# Patient Record
Sex: MALE | Race: WHITE | NOT HISPANIC OR LATINO | Employment: OTHER | URBAN - NONMETROPOLITAN AREA
[De-identification: names, ages, dates, MRNs, and addresses within clinical notes are randomized per-mention and may not be internally consistent; named-entity substitution may affect disease eponyms.]

---

## 2017-02-03 ENCOUNTER — OFFICE VISIT (OUTPATIENT)
Dept: ONCOLOGY | Facility: CLINIC | Age: 57
End: 2017-02-03

## 2017-02-03 VITALS
SYSTOLIC BLOOD PRESSURE: 128 MMHG | BODY MASS INDEX: 20.62 KG/M2 | HEART RATE: 80 BPM | HEIGHT: 70 IN | TEMPERATURE: 96.7 F | RESPIRATION RATE: 16 BRPM | DIASTOLIC BLOOD PRESSURE: 84 MMHG | OXYGEN SATURATION: 98 % | WEIGHT: 144 LBS

## 2017-02-03 DIAGNOSIS — C02.9 TONGUE CANCER (HCC): Primary | ICD-10-CM

## 2017-02-03 PROCEDURE — 99214 OFFICE O/P EST MOD 30 MIN: CPT | Performed by: INTERNAL MEDICINE

## 2017-02-03 RX ORDER — PANTOPRAZOLE SODIUM 40 MG/1
TABLET, DELAYED RELEASE ORAL
COMMUNITY
Start: 2016-02-08 | End: 2018-02-28

## 2017-02-03 NOTE — PROGRESS NOTES
"[facility] Hematology/Oncology    David Bullock  [id number]  1960    2/3/2017      Subjective    here for a 3 month office visit  There are no diagnoses linked to this encounter.  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  Status post major surgeries, chemotherapy and radiation.  Had the metastatic care cancer to several neck nodes.  Continue to follow-up at [facility].  Most recently had the bone and skin graft to the left jaw area.  His pain is relatively well-controlled on Narco.      Past Medical History, Past Surgical History, Social History, Family History have been reviewed and are without significant change .    ROS:    A comprehensive 14 point review of systems performed and was negative except for chronic dysphonia and chronic cancer pain.         Medications:  The current medication list was reviewed in the EMR    ALLERGIES:    Allergies   Allergen Reactions   • Codeine Nausea And Vomiting       Objective      Vitals:    02/03/17 1033   BP: 128/84   Pulse: 80   Resp: 16   Temp: 96.7 °F (35.9 °C)   TempSrc: Tympanic   SpO2: 98%   Weight: 144 lb (65.3 kg)   Height: 70\" (177.8 cm)           EXAM:  Gen: Cooperative, in no acute distress.  HEENT: No icterus or pallor, oral mucosa moist, PERRLA, EOMI. postop changes noted.  Neck: Supple, no JVD, no thyromegaly   Lymphatics: No cervical, axillary , inguinal lymphadenopathy.  Resp: CTA B/L no wheeze or crackles.  Abdomen: Soft, NT , ND, no hepatosplenomegaly  EXT:  No pedal edema or cyanosis  CNS: AAO x 3 , no focal deficit.  Skin: No rash        RECENT LABS:  Hematology WBC   Date Value Ref Range Status   11/08/2016 5.50 4.80 - 10.80 10*3/mm3 Final     RBC   Date Value Ref Range Status   11/08/2016 4.03 (L) 4.70 - 6.10 10*6/mm3 Final     HEMOGLOBIN   Date Value Ref Range Status "   11/08/2016 11.8 (L) 14.0 - 18.0 g/dL Final     HEMATOCRIT   Date Value Ref Range Status   11/08/2016 38.6 (L) 42.0 - 52.0 % Final     PLATELETS   Date Value Ref Range Status   11/08/2016 217 130 - 400 10*3/mm3 Final       Lab Results   Component Value Date    GLUCOSE 129 (H) 11/08/2016    BUN 15 11/08/2016    CREATININE 0.90 11/08/2016    EGFRIFNONA 87 11/08/2016    BCR 16.7 11/08/2016    CO2 31.0 (H) 11/08/2016    CALCIUM 10.1 11/08/2016    ALBUMIN 4.40 11/08/2016    LABIL2 1.1 11/08/2016    AST 29 11/08/2016    ALT 20 (L) 11/08/2016            Assessment/Plan     1.  Stage IV a moderately differentiated invasive oral cavity squamous cancer status post multiple major surgeries, chemotherapy and radiation.  Clinically CLARK.  We will continue ENT surveillance and postop evaluation at [facility], neck supple and march/2017.    2.  Chronic pain syndrome.  Declined long acting morphine sulfate due to inability to swallow.  Declined fentanyl patch.  Continue Narco.  Refill 120 tablet and that should last for good three-month period    3.  Nutrition.  Able to have some the mouth and continued to have tube feeding.  We will check Iron, B12 and folate at next visit along with CBC.              Josh Schultz MD    2/3/2017

## 2017-02-22 ENCOUNTER — OFFICE VISIT (OUTPATIENT)
Dept: FAMILY MEDICINE CLINIC | Age: 57
End: 2017-02-22
Payer: MEDICARE

## 2017-02-22 VITALS
SYSTOLIC BLOOD PRESSURE: 104 MMHG | BODY MASS INDEX: 19.47 KG/M2 | RESPIRATION RATE: 16 BRPM | WEIGHT: 136 LBS | DIASTOLIC BLOOD PRESSURE: 76 MMHG | HEART RATE: 110 BPM | OXYGEN SATURATION: 93 % | TEMPERATURE: 98.1 F | HEIGHT: 70 IN

## 2017-02-22 DIAGNOSIS — J06.9 ACUTE URI: Primary | ICD-10-CM

## 2017-02-22 DIAGNOSIS — E78.2 MIXED HYPERLIPIDEMIA: ICD-10-CM

## 2017-02-22 DIAGNOSIS — I72.0 CAROTID ANEURYSM, LEFT (HCC): ICD-10-CM

## 2017-02-22 DIAGNOSIS — I65.29 STENOSIS OF CAROTID ARTERY, UNSPECIFIED LATERALITY: ICD-10-CM

## 2017-02-22 PROCEDURE — G8420 CALC BMI NORM PARAMETERS: HCPCS | Performed by: NURSE PRACTITIONER

## 2017-02-22 PROCEDURE — 3017F COLORECTAL CA SCREEN DOC REV: CPT | Performed by: NURSE PRACTITIONER

## 2017-02-22 PROCEDURE — 1036F TOBACCO NON-USER: CPT | Performed by: NURSE PRACTITIONER

## 2017-02-22 PROCEDURE — G8427 DOCREV CUR MEDS BY ELIG CLIN: HCPCS | Performed by: NURSE PRACTITIONER

## 2017-02-22 PROCEDURE — 99214 OFFICE O/P EST MOD 30 MIN: CPT | Performed by: NURSE PRACTITIONER

## 2017-02-22 PROCEDURE — G8484 FLU IMMUNIZE NO ADMIN: HCPCS | Performed by: NURSE PRACTITIONER

## 2017-02-22 PROCEDURE — G8598 ASA/ANTIPLAT THER USED: HCPCS | Performed by: NURSE PRACTITIONER

## 2017-02-22 RX ORDER — CETIRIZINE HYDROCHLORIDE 10 MG/1
10 TABLET ORAL DAILY
Qty: 30 TABLET | Refills: 1 | Status: SHIPPED | OUTPATIENT
Start: 2017-02-22 | End: 2018-05-30

## 2017-02-22 RX ORDER — BENZONATATE 200 MG/1
200 CAPSULE ORAL 3 TIMES DAILY PRN
Qty: 30 CAPSULE | Refills: 0 | Status: SHIPPED | OUTPATIENT
Start: 2017-02-22 | End: 2017-03-04

## 2017-02-22 RX ORDER — AZITHROMYCIN 250 MG/1
TABLET, FILM COATED ORAL
Qty: 1 PACKET | Refills: 0 | Status: SHIPPED | OUTPATIENT
Start: 2017-02-22 | End: 2017-03-04

## 2017-02-22 RX ORDER — CLOPIDOGREL BISULFATE 75 MG/1
75 TABLET ORAL DAILY
Qty: 30 TABLET | Refills: 11 | Status: SHIPPED | OUTPATIENT
Start: 2017-02-22 | End: 2018-02-19 | Stop reason: SDUPTHER

## 2017-02-22 ASSESSMENT — ENCOUNTER SYMPTOMS
SHORTNESS OF BREATH: 0
NAUSEA: 0
BLOOD IN STOOL: 0
ABDOMINAL PAIN: 0
TROUBLE SWALLOWING: 1
CONSTIPATION: 0
COUGH: 1
DIARRHEA: 0

## 2017-04-13 RX ORDER — HYDROCODONE BITARTRATE AND ACETAMINOPHEN 10; 325 MG/1; MG/1
1 TABLET ORAL EVERY 6 HOURS PRN
Qty: 120 TABLET | Refills: 0 | Status: SHIPPED | OUTPATIENT
Start: 2017-04-13 | End: 2017-06-13 | Stop reason: SDUPTHER

## 2017-05-03 ENCOUNTER — LAB (OUTPATIENT)
Dept: ONCOLOGY | Facility: CLINIC | Age: 57
End: 2017-05-03

## 2017-05-03 ENCOUNTER — OFFICE VISIT (OUTPATIENT)
Dept: ONCOLOGY | Facility: CLINIC | Age: 57
End: 2017-05-03

## 2017-05-03 VITALS
TEMPERATURE: 96.7 F | WEIGHT: 145.1 LBS | SYSTOLIC BLOOD PRESSURE: 144 MMHG | BODY MASS INDEX: 20.77 KG/M2 | DIASTOLIC BLOOD PRESSURE: 84 MMHG | OXYGEN SATURATION: 97 % | HEIGHT: 70 IN | HEART RATE: 60 BPM | RESPIRATION RATE: 16 BRPM

## 2017-05-03 DIAGNOSIS — T45.1X5A ANEMIA DUE TO CHEMOTHERAPY: Primary | ICD-10-CM

## 2017-05-03 DIAGNOSIS — D64.9 ANEMIA, UNSPECIFIED TYPE: Primary | ICD-10-CM

## 2017-05-03 DIAGNOSIS — D64.81 ANEMIA DUE TO CHEMOTHERAPY: Primary | ICD-10-CM

## 2017-05-03 DIAGNOSIS — C76.0 HEAD AND NECK CANCER (HCC): Primary | ICD-10-CM

## 2017-05-03 LAB
ALBUMIN SERPL-MCNC: 4.2 G/DL (ref 3.5–5)
ALBUMIN/GLOB SERPL: 1.2 G/DL
ALP SERPL-CCNC: 89 U/L (ref 38–126)
ALT SERPL W P-5'-P-CCNC: 22 U/L (ref 21–72)
ANION GAP SERPL CALCULATED.3IONS-SCNC: 8 MMOL/L
AST SERPL-CCNC: 34 U/L (ref 5–40)
AUTO MIXED CELLS #: 0.3 10*3/UL (ref 0.1–1.5)
AUTO MIXED CELLS %: 6.5 % (ref 0.2–15.1)
BILIRUB SERPL-MCNC: 0.6 MG/DL (ref 0.2–1.3)
BUN BLD-MCNC: 13 MG/DL (ref 9–21)
BUN/CREAT SERPL: 14.4 (ref 7–25)
CALCIUM SPEC-SCNC: 9.7 MG/DL (ref 8.4–10.2)
CHLORIDE SERPL-SCNC: 102 MMOL/L (ref 98–107)
CO2 SERPL-SCNC: 31 MMOL/L (ref 22–30)
CREAT BLD-MCNC: 0.9 MG/DL (ref 0.8–1.5)
ERYTHROCYTE [DISTWIDTH] IN BLOOD BY AUTOMATED COUNT: 15.1 % (ref 11.5–14.5)
GFR SERPL CREATININE-BSD FRML MDRD: 86 ML/MIN/1.73
GLOBULIN UR ELPH-MCNC: 3.4 GM/DL
GLUCOSE BLD-MCNC: 88 MG/DL (ref 75–110)
HCT VFR BLD AUTO: 37 % (ref 42–52)
HGB BLD-MCNC: 11.8 G/DL (ref 14–18)
LYMPHOCYTES # BLD AUTO: 1.1 10*3/MM3 (ref 0.8–7)
LYMPHOCYTES NFR BLD AUTO: 21 % (ref 10–58.5)
MCH RBC QN AUTO: 30.4 PG (ref 27–31)
MCHC RBC AUTO-ENTMCNC: 31.9 G/DL (ref 33–37)
MCV RBC AUTO: 95.3 FL (ref 80–94)
NEUTROPHILS # BLD AUTO: 3.7 10*3/MM3 (ref 2–7.8)
NEUTROPHILS NFR BLD AUTO: 72.5 % (ref 37–92)
PLATELET # BLD AUTO: 327 10*3/MM3 (ref 130–400)
PMV BLD AUTO: 8.8 FL (ref 6–12)
POTASSIUM BLD-SCNC: 4.8 MMOL/L (ref 3.6–5)
PROT SERPL-MCNC: 7.6 G/DL (ref 6.3–8.2)
RBC # BLD AUTO: 3.88 10*6/MM3 (ref 4.7–6.1)
SODIUM BLD-SCNC: 141 MMOL/L (ref 137–145)
WBC NRBC COR # BLD: 5.1 10*3/MM3 (ref 4.8–10.8)

## 2017-05-03 PROCEDURE — 99214 OFFICE O/P EST MOD 30 MIN: CPT | Performed by: INTERNAL MEDICINE

## 2017-05-03 PROCEDURE — 80053 COMPREHEN METABOLIC PANEL: CPT | Performed by: INTERNAL MEDICINE

## 2017-05-03 PROCEDURE — 36415 COLL VENOUS BLD VENIPUNCTURE: CPT | Performed by: INTERNAL MEDICINE

## 2017-05-03 PROCEDURE — 85025 COMPLETE CBC W/AUTO DIFF WBC: CPT | Performed by: INTERNAL MEDICINE

## 2017-05-03 RX ORDER — CLARITHROMYCIN 500 MG/1
TABLET, COATED ORAL
COMMUNITY
End: 2017-08-03

## 2017-05-03 RX ORDER — CETIRIZINE HYDROCHLORIDE 10 MG/1
TABLET ORAL
COMMUNITY
Start: 2017-02-22 | End: 2017-08-03

## 2017-05-03 RX ORDER — LANSOPRAZOLE, AMOXICILLIN, CLARITHROMYCIN 30-500-500
KIT ORAL
COMMUNITY
Start: 2015-12-14 | End: 2017-08-03

## 2017-05-09 LAB — TSH SERPL DL<=0.005 MIU/L-ACNC: 0.61 MIU/ML (ref 0.47–4.68)

## 2017-06-13 RX ORDER — HYDROCODONE BITARTRATE AND ACETAMINOPHEN 10; 325 MG/1; MG/1
1 TABLET ORAL EVERY 6 HOURS PRN
Qty: 120 TABLET | Refills: 0 | Status: SHIPPED | OUTPATIENT
Start: 2017-06-13 | End: 2017-08-03 | Stop reason: SDUPTHER

## 2017-08-03 ENCOUNTER — LAB (OUTPATIENT)
Dept: ONCOLOGY | Facility: CLINIC | Age: 57
End: 2017-08-03

## 2017-08-03 ENCOUNTER — OFFICE VISIT (OUTPATIENT)
Dept: ONCOLOGY | Facility: CLINIC | Age: 57
End: 2017-08-03

## 2017-08-03 VITALS
HEIGHT: 70 IN | SYSTOLIC BLOOD PRESSURE: 122 MMHG | RESPIRATION RATE: 16 BRPM | OXYGEN SATURATION: 98 % | HEART RATE: 60 BPM | BODY MASS INDEX: 20.96 KG/M2 | TEMPERATURE: 96.9 F | DIASTOLIC BLOOD PRESSURE: 82 MMHG | WEIGHT: 146.4 LBS

## 2017-08-03 DIAGNOSIS — C76.0 HEAD AND NECK CANCER (HCC): Primary | ICD-10-CM

## 2017-08-03 DIAGNOSIS — C76.0 MALIGNANT NEOPLASM OF HEAD, FACE, AND NECK (HCC): Primary | ICD-10-CM

## 2017-08-03 LAB
ALBUMIN SERPL-MCNC: 4.2 G/DL (ref 3.5–5)
ALBUMIN/GLOB SERPL: 1.3 G/DL
ALP SERPL-CCNC: 114 U/L (ref 38–126)
ALT SERPL W P-5'-P-CCNC: 21 U/L (ref 21–72)
ANION GAP SERPL CALCULATED.3IONS-SCNC: 8 MMOL/L
AST SERPL-CCNC: 29 U/L (ref 5–40)
AUTO MIXED CELLS #: 0.3 10*3/MM3 (ref 0.1–1.5)
AUTO MIXED CELLS %: 7.5 % (ref 0.2–15.1)
BILIRUB SERPL-MCNC: 0.5 MG/DL (ref 0.2–1.3)
BUN BLD-MCNC: 8 MG/DL (ref 9–21)
BUN/CREAT SERPL: 8 (ref 7–25)
CALCIUM SPEC-SCNC: 9.6 MG/DL (ref 8.4–10.2)
CHLORIDE SERPL-SCNC: 103 MMOL/L (ref 98–107)
CO2 SERPL-SCNC: 30 MMOL/L (ref 22–30)
CREAT BLD-MCNC: 1 MG/DL (ref 0.8–1.5)
ERYTHROCYTE [DISTWIDTH] IN BLOOD BY AUTOMATED COUNT: 14.2 % (ref 11.5–14.5)
GFR SERPL CREATININE-BSD FRML MDRD: 76 ML/MIN/1.73
GLOBULIN UR ELPH-MCNC: 3.3 GM/DL
GLUCOSE BLD-MCNC: 88 MG/DL (ref 75–110)
HCT VFR BLD AUTO: 39 % (ref 42–52)
HGB BLD-MCNC: 12.3 G/DL (ref 14–18)
LYMPHOCYTES # BLD AUTO: 1 10*3/MM3 (ref 0.8–7)
LYMPHOCYTES NFR BLD AUTO: 27.7 % (ref 10–58.5)
MCH RBC QN AUTO: 29.7 PG (ref 27–31)
MCHC RBC AUTO-ENTMCNC: 31.5 G/DL (ref 33–37)
MCV RBC AUTO: 94.3 FL (ref 80–94)
NEUTROPHILS # BLD AUTO: 2.3 10*3/MM3 (ref 2–7.8)
NEUTROPHILS NFR BLD AUTO: 64.8 % (ref 37–92)
PLATELET # BLD AUTO: 291 10*3/MM3 (ref 130–400)
PMV BLD AUTO: 8.2 FL (ref 6–12)
POTASSIUM BLD-SCNC: 4.9 MMOL/L (ref 3.6–5)
PROT SERPL-MCNC: 7.5 G/DL (ref 6.3–8.2)
RBC # BLD AUTO: 4.14 10*6/MM3 (ref 4.7–6.1)
SODIUM BLD-SCNC: 141 MMOL/L (ref 137–145)
WBC NRBC COR # BLD: 3.5 10*3/MM3 (ref 4.8–10.8)

## 2017-08-03 PROCEDURE — 85025 COMPLETE CBC W/AUTO DIFF WBC: CPT | Performed by: INTERNAL MEDICINE

## 2017-08-03 PROCEDURE — 36415 COLL VENOUS BLD VENIPUNCTURE: CPT | Performed by: INTERNAL MEDICINE

## 2017-08-03 PROCEDURE — 99213 OFFICE O/P EST LOW 20 MIN: CPT | Performed by: INTERNAL MEDICINE

## 2017-08-03 PROCEDURE — 80053 COMPREHEN METABOLIC PANEL: CPT | Performed by: INTERNAL MEDICINE

## 2017-08-03 RX ORDER — HYDROCODONE BITARTRATE AND ACETAMINOPHEN 10; 325 MG/1; MG/1
1 TABLET ORAL EVERY 6 HOURS PRN
Qty: 120 TABLET | Refills: 0 | Status: SHIPPED | OUTPATIENT
Start: 2017-08-03 | End: 2017-09-14 | Stop reason: SDUPTHER

## 2017-08-03 NOTE — PROGRESS NOTES
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  Status post major surgeries, including bone graft from his right lower leg to his jaw, chemotherapy and radiation.  Continue to follow-up at [facility].   he has follow-up August 9.  He is due for follow-up CT scan which he will obtain in [place]  His pain is relatively well-controlled on Narco.  He is here today for refill of his normal call.  He is on puréed diet per oral, only uses his PEG tube for medication.  He denies fever chills, denies nausea or vomiting, denies headache.  He has maintained his weight.    Past Medical History:   Past Medical History:   Diagnosis Date   • Dysphonia    • Encounter for antineoplastic chemotherapy    • Encounter for long-term (current) use of medications    • Foot drop    • Iron deficiency anemia, unspecified    • Squamous cell cancer of tongue      Past Surgical History:   Past Surgical History:   Procedure Laterality Date   • CORONARY STENT PLACEMENT     • MOUTH SURGERY     • PORTACATH PLACEMENT     • TONGUE BIOPSY / EXCISION       Social History:   Social History     Social History   • Marital status: Single     Spouse name: N/A   • Number of children: N/A   • Years of education: N/A     Occupational History   • Not on file.     Social History Main Topics   • Smoking status: Former Smoker   • Smokeless tobacco: Not on file   • Alcohol use No   • Drug use: No   • Sexual activity: Defer     Other Topics Concern   • Not on file     Social History Narrative     Family History:   Family History   Problem Relation Age of Onset   • No Known Problems Mother    • Heart failure Father    • No Known Problems Daughter    • No Known Problems Son    • No Known Problems Daughter        Review of Systems  "  Constitutional: Negative.    HENT: Negative for congestion, dental problem, drooling, ear discharge, ear pain, facial swelling, hearing loss, mouth sores, sinus pressure, sore throat, tinnitus, trouble swallowing and voice change.    Eyes: Negative for photophobia, pain, discharge, redness, itching and visual disturbance.   Respiratory: Negative for cough, choking, chest tightness, shortness of breath, wheezing and stridor.    Cardiovascular: Negative.    Gastrointestinal: Negative for abdominal distention, abdominal pain, constipation, diarrhea, nausea and vomiting.   Endocrine: Negative for polyuria.   Genitourinary: Negative for flank pain and hematuria.   Musculoskeletal: Negative.    Skin: Negative for color change.   Neurological: Negative for dizziness, tremors, seizures, syncope, facial asymmetry, speech difficulty, weakness, light-headedness, numbness and headaches.   Hematological: Negative for adenopathy. Does not bruise/bleed easily.   Psychiatric/Behavioral: Negative for behavioral problems and confusion.        Medications:  The current medication list was reviewed in the EMR    ALLERGIES:    Allergies   Allergen Reactions   • Codeine Nausea And Vomiting       Objective      Vitals:    08/03/17 0905   BP: 122/82   Pulse: 60   Resp: 16   Temp: 96.9 °F (36.1 °C)   TempSrc: Tympanic   SpO2: 98%   Weight: 146 lb 6.4 oz (66.4 kg)   Height: 70\" (177.8 cm)       Current Status 8/3/2017   ECOG score 1         General Appearance:   Cachectic-appearing, Alert, cooperative, no distress, appears stated age   Head:    Normocephalic, without obvious abnormality, atraumatic   Eyes:    PERRL, conjunctiva/corneas clear, EOM's intact, fundi     benign, both eyes   Ears:    Normal TM's and external ear canals, both ears   Nose:   Nares normal, septum midline, mucosa normal, no drainage     or sinus tenderness   Throat:   Lips, mucosa, and tongue normal; teeth and gums normal   Neck:   Surgical scar well-healed, " radiation changes, scab on the left upper jaw. no adenopathy;     thyroid:  no enlargement/tenderness/nodules; no carotid    bruit or JVD   Back:     Symmetric, no curvature, ROM normal, no CVA tenderness   Lungs:     Clear to auscultation bilaterally, respirations unlabored   Chest Wall:    No tenderness or deformity    Heart:    Regular rate and rhythm, S1 and S2 normal, no murmur, rub    or gallop   Abdomen:     Soft, non-tender, bowel sounds active all four quadrants,     no masses, no organomegaly.PEG tube intact    Extremities:   Extremities normal, atraumatic, no cyanosis or edema   Pulses:   2+ and symmetric all extremities   Skin:   Skin color, texture, turgor normal, no rashes or lesions   Lymph nodes:   Cervical, supraclavicular, and axillary nodes normal   Neurologic:   CNII-XII intact, normal strength, sensation and reflexes     throughout       RECENT LABS:  Lab Results (last 24 hours)     ** No results found for the last 24 hours. **          RADIOLOGY:  No results found.      VTE prophylaxis reviewed and addressed    Assessment/Plan  57-year-old male diagnosed with moderately differentiated invasive squamous cell carcinoma of the oral cavity stage IV a status post major surgeries including jaw transplant from a bone in his right leg, status post chemotherapy and radiation, currently CLARK.  Continue follow-up at the [facility] in [place]. CT due at that visit .  We will  see him in 3 months unless things change.  Jaw pain: Continue Narco refilled today.           Time Spent: 30 minutes; greater than  50% of time was spent in patient counseling and care coordination.       Ena Kelsey MD    8/4/2017    1:06 PM

## 2017-09-14 RX ORDER — HYDROCODONE BITARTRATE AND ACETAMINOPHEN 10; 325 MG/1; MG/1
1 TABLET ORAL EVERY 6 HOURS PRN
Qty: 120 TABLET | Refills: 0 | Status: SHIPPED | OUTPATIENT
Start: 2017-09-14 | End: 2017-10-24 | Stop reason: SDUPTHER

## 2017-10-24 RX ORDER — HYDROCODONE BITARTRATE AND ACETAMINOPHEN 10; 325 MG/1; MG/1
1 TABLET ORAL EVERY 6 HOURS PRN
Qty: 120 TABLET | Refills: 0 | Status: SHIPPED | OUTPATIENT
Start: 2017-10-24 | End: 2017-11-29 | Stop reason: SDUPTHER

## 2017-11-03 DIAGNOSIS — C76.0 MALIGNANT NEOPLASM OF HEAD, FACE, AND NECK (HCC): Primary | ICD-10-CM

## 2017-11-07 ENCOUNTER — APPOINTMENT (OUTPATIENT)
Dept: LAB | Facility: HOSPITAL | Age: 57
End: 2017-11-07

## 2017-11-13 ENCOUNTER — APPOINTMENT (OUTPATIENT)
Dept: LAB | Facility: HOSPITAL | Age: 57
End: 2017-11-13

## 2017-11-13 DIAGNOSIS — C76.0 MALIGNANT NEOPLASM OF HEAD, FACE, AND NECK (HCC): Primary | ICD-10-CM

## 2017-11-15 ENCOUNTER — APPOINTMENT (OUTPATIENT)
Dept: LAB | Facility: HOSPITAL | Age: 57
End: 2017-11-15

## 2017-11-27 DIAGNOSIS — C76.0 MALIGNANT NEOPLASM OF HEAD, FACE, AND NECK (HCC): Primary | ICD-10-CM

## 2017-11-29 ENCOUNTER — OFFICE VISIT (OUTPATIENT)
Dept: ONCOLOGY | Facility: CLINIC | Age: 57
End: 2017-11-29

## 2017-11-29 ENCOUNTER — LAB (OUTPATIENT)
Dept: LAB | Facility: HOSPITAL | Age: 57
End: 2017-11-29

## 2017-11-29 VITALS
BODY MASS INDEX: 20.14 KG/M2 | WEIGHT: 140.7 LBS | SYSTOLIC BLOOD PRESSURE: 148 MMHG | RESPIRATION RATE: 18 BRPM | HEIGHT: 70 IN | DIASTOLIC BLOOD PRESSURE: 88 MMHG | HEART RATE: 52 BPM | OXYGEN SATURATION: 98 % | TEMPERATURE: 97.9 F

## 2017-11-29 DIAGNOSIS — D50.8 OTHER IRON DEFICIENCY ANEMIA: Primary | ICD-10-CM

## 2017-11-29 DIAGNOSIS — C76.0 HEAD AND NECK CANCER (HCC): Primary | ICD-10-CM

## 2017-11-29 DIAGNOSIS — C76.0 MALIGNANT NEOPLASM OF HEAD, FACE AND NECK (HCC): Primary | ICD-10-CM

## 2017-11-29 LAB
ALBUMIN SERPL-MCNC: 4 G/DL (ref 3.5–5)
ALBUMIN/GLOB SERPL: 1.3 G/DL (ref 1.1–2.5)
ALP SERPL-CCNC: 101 U/L (ref 24–120)
ALT SERPL W P-5'-P-CCNC: 33 U/L (ref 0–54)
ANION GAP SERPL CALCULATED.3IONS-SCNC: 9 MMOL/L (ref 4–13)
AST SERPL-CCNC: 26 U/L (ref 7–45)
AUTO MIXED CELLS #: 0.7 10*3/MM3 (ref 0.1–2.6)
AUTO MIXED CELLS %: 12.4 % (ref 0.1–24)
BILIRUB SERPL-MCNC: 0.4 MG/DL (ref 0.1–1)
BUN BLD-MCNC: 12 MG/DL (ref 5–21)
BUN/CREAT SERPL: 15.2
CALCIUM SPEC-SCNC: 9.5 MG/DL (ref 8.4–10.4)
CHLORIDE SERPL-SCNC: 98 MMOL/L (ref 98–110)
CO2 SERPL-SCNC: 30 MMOL/L (ref 24–31)
CREAT BLD-MCNC: 0.79 MG/DL (ref 0.5–1.4)
ERYTHROCYTE [DISTWIDTH] IN BLOOD BY AUTOMATED COUNT: 14.2 % (ref 12–15)
FERRITIN SERPL-MCNC: 44.4 NG/ML (ref 17.9–464)
GFR SERPL CREATININE-BSD FRML MDRD: 100 ML/MIN/1.73
GLOBULIN UR ELPH-MCNC: 3.2 GM/DL
GLUCOSE BLD-MCNC: 88 MG/DL (ref 70–100)
HCT VFR BLD AUTO: 34.8 % (ref 40–52)
HGB BLD-MCNC: 11.8 G/DL (ref 14–18)
HOLD SPECIMEN: NORMAL
IRON 24H UR-MRATE: 75 MCG/DL (ref 42–180)
IRON SATN MFR SERPL: 19 % (ref 20–45)
LYMPHOCYTES # BLD AUTO: 1.4 10*3/MM3 (ref 0.8–7)
LYMPHOCYTES NFR BLD AUTO: 25.6 % (ref 15–45)
MCH RBC QN AUTO: 30.5 PG (ref 28–32)
MCHC RBC AUTO-ENTMCNC: 33.9 G/DL (ref 33–36)
MCV RBC AUTO: 89.9 FL (ref 82–95)
NEUTROPHILS # BLD AUTO: 3.2 10*3/MM3 (ref 1.5–8.3)
NEUTROPHILS NFR BLD AUTO: 62 % (ref 39–78)
PLATELET # BLD AUTO: 251 10*3/MM3 (ref 130–400)
PMV BLD AUTO: 9.1 FL (ref 6–12)
POTASSIUM BLD-SCNC: 4.3 MMOL/L (ref 3.5–5.3)
PROT SERPL-MCNC: 7.2 G/DL (ref 6.3–8.7)
RBC # BLD AUTO: 3.87 10*6/MM3 (ref 4.2–5.4)
SODIUM BLD-SCNC: 137 MMOL/L (ref 135–145)
TIBC SERPL-MCNC: 395 MCG/DL (ref 225–420)
WBC NRBC COR # BLD: 5.3 10*3/MM3 (ref 4.8–10.8)

## 2017-11-29 PROCEDURE — 83540 ASSAY OF IRON: CPT | Performed by: INTERNAL MEDICINE

## 2017-11-29 PROCEDURE — 83550 IRON BINDING TEST: CPT | Performed by: INTERNAL MEDICINE

## 2017-11-29 PROCEDURE — 36415 COLL VENOUS BLD VENIPUNCTURE: CPT

## 2017-11-29 PROCEDURE — 85025 COMPLETE CBC W/AUTO DIFF WBC: CPT | Performed by: INTERNAL MEDICINE

## 2017-11-29 PROCEDURE — 99214 OFFICE O/P EST MOD 30 MIN: CPT | Performed by: INTERNAL MEDICINE

## 2017-11-29 PROCEDURE — 80053 COMPREHEN METABOLIC PANEL: CPT | Performed by: INTERNAL MEDICINE

## 2017-11-29 PROCEDURE — 82728 ASSAY OF FERRITIN: CPT | Performed by: INTERNAL MEDICINE

## 2017-11-29 RX ORDER — HYDROCODONE BITARTRATE AND ACETAMINOPHEN 10; 325 MG/1; MG/1
1 TABLET ORAL EVERY 6 HOURS PRN
Qty: 120 TABLET | Refills: 0 | Status: SHIPPED | OUTPATIENT
Start: 2017-11-29 | End: 2018-01-02 | Stop reason: SDUPTHER

## 2017-11-29 NOTE — PROGRESS NOTES
"[facility] Hematology/Oncology    David Bullock  [id number]  1960 11/29/2017      Subjective    here for a 3 month office visit   was seen today for follow-up.    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  Status post major surgeries, chemotherapy and radiation.  Had the metastatic care cancer to several neck nodes.  Continue to follow-up at [facility].  Most recently had the bone and skin graft to the left jaw area.  His pain is relatively well-controlled on Narco.      Past Medical History, Past Surgical History, Social History, Family History have been reviewed and are without significant change .    ROS:    A comprehensive 14 point review of systems performed and was negative except for chronic dysphonia and chronic cancer pain.         Medications:  The current medication list was reviewed in the EMR    ALLERGIES:    Allergies   Allergen Reactions   • Codeine Nausea And Vomiting       Objective      Vitals:    11/29/17 1353   BP: 148/88   Pulse: 52   Resp: 18   Temp: 97.9 °F (36.6 °C)   TempSrc: Tympanic   SpO2: 98%   Weight: 140 lb 11.2 oz (63.8 kg)   Height: 70\" (177.8 cm)           EXAM:  Gen: Cooperative, in no acute distress.  HEENT: No icterus or pallor, oral mucosa moist, PERRLA, EOMI. postop changes noted.  Neck: Supple, no JVD, no thyromegaly   Lymphatics: No cervical, axillary , inguinal lymphadenopathy.  Resp: CTA B/L no wheeze or crackles.  Abdomen: Soft, NT , ND, no hepatosplenomegaly  EXT:  No pedal edema or cyanosis  CNS: AAO x 3 , no focal deficit.  Skin: No rash        RECENT LABS:  Hematology WBC   Date Value Ref Range Status   11/29/2017 5.30 4.80 - 10.80 10*3/mm3 Final     RBC   Date Value Ref Range Status   11/29/2017 3.87 (L) 4.20 " - 5.40 10*6/mm3 Final     Hemoglobin   Date Value Ref Range Status   11/29/2017 11.8 (L) 14.0 - 18.0 g/dL Final     Hematocrit   Date Value Ref Range Status   11/29/2017 34.8 (L) 40.0 - 52.0 % Final     Platelets   Date Value Ref Range Status   11/29/2017 251 130 - 400 10*3/mm3 Final       Lab Results   Component Value Date    GLUCOSE 88 08/03/2017    BUN 8 (L) 08/03/2017    CREATININE 1.00 08/03/2017    EGFRIFNONA 76 08/03/2017    BCR 8.0 08/03/2017    CO2 30.0 08/03/2017    CALCIUM 9.6 08/03/2017    ALBUMIN 4.20 08/03/2017    LABIL2 1.3 08/03/2017    AST 29 08/03/2017    ALT 21 08/03/2017            Assessment/Plan     1.  Stage IV a moderately differentiated invasive oral cavity squamous cancer status post multiple major surgeries, chemotherapy and radiation, 2013.  Clinically CLARK.  We will continue ENT surveillance and postop evaluation at [facility], who are planning to see him next month. If they do not order a CT scan of Head and Neck and chest then I would be doing it here.  He removed the G tube himself and therefore has lost 6 Ilbs. I have encouraged him to incrase suppliments like boost etc.    2.  Chronic pain syndrome.  Declined long acting morphine sulfate due to inability to swallow.  Declined fentanyl patch.  Continue Narco.  Refill 120 tablet and that should last for good three-month period    3.  Nutrition.  Able to have some by mouth and continued to have tube feeding.  We will check TSH.    4.  He has an appointment at [facility] next 2 weeks therefore we will let them do further imaging studies.  The last imaging studies done here or in August 2016 were negative.              Nickolas Hines MD    11/29/2017

## 2018-01-02 RX ORDER — HYDROCODONE BITARTRATE AND ACETAMINOPHEN 10; 325 MG/1; MG/1
1 TABLET ORAL EVERY 6 HOURS PRN
Qty: 120 TABLET | Refills: 0 | Status: SHIPPED | OUTPATIENT
Start: 2018-01-02 | End: 2018-02-01 | Stop reason: SDUPTHER

## 2018-01-03 DIAGNOSIS — C76.0 MALIGNANT NEOPLASM OF HEAD, FACE, AND NECK (HCC): Primary | ICD-10-CM

## 2018-01-23 ENCOUNTER — HOSPITAL ENCOUNTER (OUTPATIENT)
Dept: CT IMAGING | Facility: HOSPITAL | Age: 58
Discharge: HOME | End: 2018-01-23
Attending: INTERNAL MEDICINE | Admitting: INTERNAL MEDICINE

## 2018-01-23 DIAGNOSIS — C76.0 MALIGNANT NEOPLASM OF HEAD, FACE, AND NECK (HCC): ICD-10-CM

## 2018-01-23 LAB — CREAT BLDA-MCNC: 0.9 MG/DL (ref 0.6–1.3)

## 2018-01-23 PROCEDURE — 0 IOPAMIDOL 61 % SOLUTION: Performed by: INTERNAL MEDICINE

## 2018-01-23 PROCEDURE — 70491 CT SOFT TISSUE NECK W/DYE: CPT

## 2018-01-23 PROCEDURE — 82565 ASSAY OF CREATININE: CPT

## 2018-01-23 RX ADMIN — IOPAMIDOL 100 ML: 612 INJECTION, SOLUTION INTRAVENOUS at 11:15

## 2018-02-01 RX ORDER — HYDROCODONE BITARTRATE AND ACETAMINOPHEN 10; 325 MG/1; MG/1
1 TABLET ORAL EVERY 6 HOURS PRN
Qty: 120 TABLET | Refills: 0 | Status: SHIPPED | OUTPATIENT
Start: 2018-02-01 | End: 2018-02-28 | Stop reason: SDUPTHER

## 2018-02-19 DIAGNOSIS — I65.29 STENOSIS OF CAROTID ARTERY, UNSPECIFIED LATERALITY: ICD-10-CM

## 2018-02-19 RX ORDER — CLOPIDOGREL BISULFATE 75 MG/1
TABLET ORAL
Qty: 30 TABLET | Refills: 0 | Status: SHIPPED | OUTPATIENT
Start: 2018-02-19 | End: 2018-05-23 | Stop reason: SDUPTHER

## 2018-02-26 DIAGNOSIS — C76.0 MALIGNANT NEOPLASM OF HEAD, FACE, AND NECK (HCC): Primary | ICD-10-CM

## 2018-02-28 ENCOUNTER — OFFICE VISIT (OUTPATIENT)
Dept: ONCOLOGY | Facility: CLINIC | Age: 58
End: 2018-02-28

## 2018-02-28 ENCOUNTER — LAB (OUTPATIENT)
Dept: LAB | Facility: HOSPITAL | Age: 58
End: 2018-02-28

## 2018-02-28 VITALS
HEIGHT: 70 IN | WEIGHT: 140.8 LBS | BODY MASS INDEX: 20.16 KG/M2 | HEART RATE: 60 BPM | OXYGEN SATURATION: 97 % | SYSTOLIC BLOOD PRESSURE: 138 MMHG | DIASTOLIC BLOOD PRESSURE: 78 MMHG | RESPIRATION RATE: 16 BRPM | TEMPERATURE: 97 F

## 2018-02-28 DIAGNOSIS — C76.0 HEAD AND NECK CANCER (HCC): Primary | ICD-10-CM

## 2018-02-28 LAB
ALBUMIN SERPL-MCNC: 3.8 G/DL (ref 3.5–5)
ALBUMIN/GLOB SERPL: 1.2 G/DL (ref 1.1–2.5)
ALP SERPL-CCNC: 83 U/L (ref 24–120)
ALT SERPL W P-5'-P-CCNC: 20 U/L (ref 0–54)
ANION GAP SERPL CALCULATED.3IONS-SCNC: 9 MMOL/L (ref 4–13)
AST SERPL-CCNC: 45 U/L (ref 7–45)
BASOPHILS # BLD AUTO: 0.02 10*3/MM3 (ref 0–0.2)
BASOPHILS NFR BLD AUTO: 0.2 % (ref 0–2)
BILIRUB SERPL-MCNC: 0.3 MG/DL (ref 0.1–1)
BUN BLD-MCNC: 12 MG/DL (ref 5–21)
BUN/CREAT SERPL: 17.1 (ref 7–25)
CALCIUM SPEC-SCNC: 9.3 MG/DL (ref 8.4–10.4)
CHLORIDE SERPL-SCNC: 99 MMOL/L (ref 98–110)
CO2 SERPL-SCNC: 31 MMOL/L (ref 24–31)
CREAT BLD-MCNC: 0.7 MG/DL (ref 0.5–1.4)
DEPRECATED RDW RBC AUTO: 44.5 FL (ref 40–54)
EOSINOPHIL # BLD AUTO: 0.11 10*3/MM3 (ref 0–0.7)
EOSINOPHIL NFR BLD AUTO: 1 % (ref 0–4)
ERYTHROCYTE [DISTWIDTH] IN BLOOD BY AUTOMATED COUNT: 13.5 % (ref 12–15)
GFR SERPL CREATININE-BSD FRML MDRD: 115 ML/MIN/1.73
GLOBULIN UR ELPH-MCNC: 3.2 GM/DL
GLUCOSE BLD-MCNC: 102 MG/DL (ref 70–100)
HCT VFR BLD AUTO: 32.1 % (ref 40–52)
HGB BLD-MCNC: 10.3 G/DL (ref 14–18)
HOLD SPECIMEN: NORMAL
HOLD SPECIMEN: NORMAL
IMM GRANULOCYTES # BLD: 0.03 10*3/MM3 (ref 0–0.03)
IMM GRANULOCYTES NFR BLD: 0.3 % (ref 0–5)
LYMPHOCYTES # BLD AUTO: 0.53 10*3/MM3 (ref 0.72–4.86)
LYMPHOCYTES NFR BLD AUTO: 5 % (ref 15–45)
MCH RBC QN AUTO: 28.6 PG (ref 28–32)
MCHC RBC AUTO-ENTMCNC: 32.1 G/DL (ref 33–36)
MCV RBC AUTO: 89.2 FL (ref 82–95)
MONOCYTES # BLD AUTO: 0.29 10*3/MM3 (ref 0.19–1.3)
MONOCYTES NFR BLD AUTO: 2.7 % (ref 4–12)
NEUTROPHILS # BLD AUTO: 9.57 10*3/MM3 (ref 1.87–8.4)
NEUTROPHILS NFR BLD AUTO: 90.8 % (ref 39–78)
NRBC BLD MANUAL-RTO: 0 /100 WBC (ref 0–0)
PLATELET # BLD AUTO: 416 10*3/MM3 (ref 130–400)
PMV BLD AUTO: 9.6 FL (ref 6–12)
POTASSIUM BLD-SCNC: 4.8 MMOL/L (ref 3.5–5.3)
PROT SERPL-MCNC: 7 G/DL (ref 6.3–8.7)
RBC # BLD AUTO: 3.6 10*6/MM3 (ref 4.8–5.9)
SODIUM BLD-SCNC: 139 MMOL/L (ref 135–145)
WBC NRBC COR # BLD: 10.55 10*3/MM3 (ref 4.8–10.8)

## 2018-02-28 PROCEDURE — 85025 COMPLETE CBC W/AUTO DIFF WBC: CPT | Performed by: INTERNAL MEDICINE

## 2018-02-28 PROCEDURE — 36415 COLL VENOUS BLD VENIPUNCTURE: CPT

## 2018-02-28 PROCEDURE — 80053 COMPREHEN METABOLIC PANEL: CPT | Performed by: INTERNAL MEDICINE

## 2018-02-28 PROCEDURE — 99214 OFFICE O/P EST MOD 30 MIN: CPT | Performed by: INTERNAL MEDICINE

## 2018-02-28 RX ORDER — HYDROCODONE BITARTRATE AND ACETAMINOPHEN 10; 325 MG/1; MG/1
1 TABLET ORAL EVERY 6 HOURS PRN
Qty: 120 TABLET | Refills: 0 | Status: SHIPPED | OUTPATIENT
Start: 2018-02-28 | End: 2018-03-28 | Stop reason: SDUPTHER

## 2018-02-28 NOTE — PROGRESS NOTES
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  Status post major surgeries, chemotherapy and radiation.  Had the metastatic care cancer to several neck nodes.  Continue to follow-up at [facility].  Most recently had the bone and skin graft to the left jaw area.  His pain is relatively well-controlled on Narco.      Past Medical History, Past Surgical History, Social History, Family History have been reviewed and are without significant change .    ROS:    A comprehensive 14 point review of systems performed and was negative except for chronic dysphonia and chronic cancer pain.         Medications:  The current medication list was reviewed in the EMR    ALLERGIES:    Allergies   Allergen Reactions   • Codeine Nausea And Vomiting       Objective      Vitals:    02/28/18 1317   BP: 138/78   Pulse: 60   Resp: 16   Temp: 97 °F (36.1 °C)   TempSrc: Tympanic   SpO2: 97%   Weight: 63.9 kg (140 lb 12.8 oz)   Height: 177.8 cm (70\")           EXAM:  Gen: Cooperative, in no acute distress.  HEENT: No icterus or pallor, oral mucosa moist, PERRLA, EOMI. postop changes noted.  Neck: Supple, no JVD, no thyromegaly   Lymphatics: No cervical, axillary , inguinal lymphadenopathy.  Resp: CTA B/L no wheeze or crackles.  Abdomen: Soft, NT , ND, no hepatosplenomegaly  EXT:  No pedal edema or cyanosis  CNS: AAO x 3 , no focal deficit.  Skin: No rash        RECENT LABS:  Hematology WBC   Date Value Ref Range Status   02/28/2018 10.55 4.80 - 10.80 10*3/mm3 Final     RBC   Date Value Ref Range Status   02/28/2018 3.60 (L) 4.80 - 5.90 10*6/mm3 Final     Hemoglobin "   Date Value Ref Range Status   02/28/2018 10.3 (L) 14.0 - 18.0 g/dL Final     Hematocrit   Date Value Ref Range Status   02/28/2018 32.1 (L) 40.0 - 52.0 % Final     Platelets   Date Value Ref Range Status   02/28/2018 416 (H) 130 - 400 10*3/mm3 Final       Lab Results   Component Value Date    GLUCOSE 102 (H) 02/28/2018    BUN 12 02/28/2018    CREATININE 0.70 02/28/2018    EGFRIFNONA 115 02/28/2018    BCR 17.1 02/28/2018    CO2 31.0 02/28/2018    CALCIUM 9.3 02/28/2018    ALBUMIN 3.80 02/28/2018    LABIL2 1.2 02/28/2018    AST 45 02/28/2018    ALT 20 02/28/2018            Assessment/Plan     1.  Stage IV a moderately differentiated invasive oral cavity squamous cancer status post multiple major surgeries, chemotherapy and radiation, 2013.  Clinically CLARK.  We will continue ENT surveillance and postop evaluation at [facility], who are planning to see him next month. If they do not order a CT scan of Head and Neck and chest then I would be doing it here.  He removed the G tube himself and therefore has lost 6 Ilbs. I have encouraged him to incrase suppliments like boost etc.    2.  Chronic pain syndrome.  Declined long acting morphine sulfate due to inability to swallow.  Declined fentanyl patch.  Continue Narco.  Refill 120 tablet and that should last for good three-month period    3.  Nutrition.  Able to have some by mouth and continued to have tube feeding.  We will check TSH.    4.  He has an appointment at [facility] next 2 weeks therefore we will let them do further imaging studies.    Repeat CT soft tissue neck Jan 2018 shows CLARK, only Carotid L Stenosis f/u Neuro Rads at [facility]. Doing well, contine monitoring, C X R prior to next vist as the risk of second cancers common in Head and Neck cancers.              Nickolas Hines MD    2/28/2018

## 2018-03-28 RX ORDER — HYDROCODONE BITARTRATE AND ACETAMINOPHEN 10; 325 MG/1; MG/1
1 TABLET ORAL EVERY 6 HOURS PRN
Qty: 120 TABLET | Refills: 0 | Status: SHIPPED | OUTPATIENT
Start: 2018-03-28 | End: 2018-05-02 | Stop reason: SDUPTHER

## 2018-04-02 DIAGNOSIS — C76.0 MALIGNANT NEOPLASM OF HEAD, FACE, AND NECK (HCC): Primary | ICD-10-CM

## 2018-04-04 ENCOUNTER — APPOINTMENT (OUTPATIENT)
Dept: LAB | Facility: HOSPITAL | Age: 58
End: 2018-04-04

## 2018-04-05 ENCOUNTER — APPOINTMENT (OUTPATIENT)
Dept: LAB | Facility: HOSPITAL | Age: 58
End: 2018-04-05

## 2018-04-13 DIAGNOSIS — C76.0 MALIGNANT NEOPLASM OF HEAD, FACE, AND NECK (HCC): Primary | ICD-10-CM

## 2018-04-17 ENCOUNTER — APPOINTMENT (OUTPATIENT)
Dept: LAB | Facility: HOSPITAL | Age: 58
End: 2018-04-17

## 2018-04-17 ENCOUNTER — OFFICE VISIT (OUTPATIENT)
Dept: ONCOLOGY | Facility: CLINIC | Age: 58
End: 2018-04-17

## 2018-04-17 VITALS
OXYGEN SATURATION: 93 % | DIASTOLIC BLOOD PRESSURE: 92 MMHG | TEMPERATURE: 96.4 F | RESPIRATION RATE: 16 BRPM | SYSTOLIC BLOOD PRESSURE: 158 MMHG | BODY MASS INDEX: 19.97 KG/M2 | WEIGHT: 139.5 LBS | HEIGHT: 70 IN | HEART RATE: 88 BPM

## 2018-04-17 DIAGNOSIS — C76.0 HEAD AND NECK CANCER (HCC): Primary | ICD-10-CM

## 2018-04-17 PROCEDURE — 99213 OFFICE O/P EST LOW 20 MIN: CPT | Performed by: INTERNAL MEDICINE

## 2018-04-17 NOTE — PROGRESS NOTES
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  Status post major surgeries, chemotherapy and radiation.  Had the metastatic care cancer to several neck nodes.  Continue to follow-up at [facility].  Most recently had the bone and skin graft to the left jaw area.  His pain is relatively well-controlled on Narco.      Past Medical History, Past Surgical History, Social History, Family History have been reviewed and are without significant change .    ROS:    A comprehensive 14 point review of systems performed and was negative except for chronic dysphonia and chronic cancer pain.         Medications:  The current medication list was reviewed in the EMR    ALLERGIES:    Allergies   Allergen Reactions   • Codeine Nausea And Vomiting       Objective      Vitals:    04/17/18 1339   BP: 158/92   Pulse: 88   Resp: 16   Temp: 96.4 °F (35.8 °C)   TempSrc: Tympanic   SpO2: 93%   Weight: 63.3 kg (139 lb 8 oz)   Height: 177.8 cm (70\")           EXAM:  Gen: Cooperative, in no acute distress.  HEENT: No icterus or pallor, oral mucosa moist, PERRLA, EOMI. postop changes noted.  Neck: Supple, no JVD, no thyromegaly   Lymphatics: No cervical, axillary , inguinal lymphadenopathy.  Resp: CTA B/L no wheeze or crackles.  Abdomen: Soft, NT , ND, no hepatosplenomegaly  EXT:  No pedal edema or cyanosis  CNS: AAO x 3 , no focal deficit.  Skin: No rash        RECENT LABS:  Hematology WBC   Date Value Ref Range Status   02/28/2018 10.55 4.80 - 10.80 10*3/mm3 Final     RBC   Date Value Ref Range Status   02/28/2018 3.60 (L) 4.80 - 5.90 10*6/mm3 Final     Hemoglobin "   Date Value Ref Range Status   02/28/2018 10.3 (L) 14.0 - 18.0 g/dL Final     Hematocrit   Date Value Ref Range Status   02/28/2018 32.1 (L) 40.0 - 52.0 % Final     Platelets   Date Value Ref Range Status   02/28/2018 416 (H) 130 - 400 10*3/mm3 Final       Lab Results   Component Value Date    GLUCOSE 102 (H) 02/28/2018    BUN 12 02/28/2018    CREATININE 0.70 02/28/2018    EGFRIFNONA 115 02/28/2018    BCR 17.1 02/28/2018    CO2 31.0 02/28/2018    CALCIUM 9.3 02/28/2018    ALBUMIN 3.80 02/28/2018    LABIL2 1.2 02/28/2018    AST 45 02/28/2018    ALT 20 02/28/2018            Assessment/Plan     1.  Stage IV a moderately differentiated invasive oral cavity squamous cancer status post multiple major surgeries, chemotherapy and radiation, 2013.  Clinically CLARK.  We will continue ENT surveillance and postop evaluation at [facility], who are planning to see him next month. If they do not order a CT scan of Head and Neck and chest then I would be doing it here.  He removed the G tube himself and therefore has lost 6 Ilbs. I have encouraged him to incrase suppliments like boost etc.    2.  Chronic pain syndrome.  Declined long acting morphine sulfate due to inability to swallow.  Declined fentanyl patch.  Continue Narco.  Refill 120 tablet and that should last for good three-month period    3.  Nutrition.  Able to have some by mouth and continued to have tube feeding.  We will check TSH.    4.  He has an appointment at [facility] next 2 weeks therefore we will let them do further imaging studies.    Repeat CT soft tissue neck Jan 2018 shows CLARK, only Carotid L Stenosis f/u Neuro Rads at [facility]. Doing well, contine monitoring, C X R prior to next vist as the risk of second cancers common in Head and Neck cancers.    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 Surgery.            Nickolas Hines MD    4/17/2018

## 2018-05-02 RX ORDER — HYDROCODONE BITARTRATE AND ACETAMINOPHEN 10; 325 MG/1; MG/1
1 TABLET ORAL EVERY 6 HOURS PRN
Qty: 120 TABLET | Refills: 0 | Status: ON HOLD | OUTPATIENT
Start: 2018-05-02 | End: 2018-05-12

## 2018-05-02 NOTE — TELEPHONE ENCOUNTER
Patient presents to office with request for refill of his pain medication, discussed with Dr Hines he gave verbal order to refill the medication, he will sign off on, patient to carry script from office.

## 2018-05-07 ENCOUNTER — APPOINTMENT (OUTPATIENT)
Dept: PREADMISSION TESTING | Facility: HOSPITAL | Age: 58
End: 2018-05-07

## 2018-05-07 VITALS
HEIGHT: 70 IN | WEIGHT: 136.91 LBS | HEART RATE: 62 BPM | OXYGEN SATURATION: 96 % | DIASTOLIC BLOOD PRESSURE: 58 MMHG | RESPIRATION RATE: 18 BRPM | SYSTOLIC BLOOD PRESSURE: 153 MMHG | BODY MASS INDEX: 19.6 KG/M2

## 2018-05-07 LAB
ALBUMIN SERPL-MCNC: 3.9 G/DL (ref 3.5–5)
ALBUMIN/GLOB SERPL: 1.1 G/DL (ref 1.1–2.5)
ALP SERPL-CCNC: 88 U/L (ref 24–120)
ALT SERPL W P-5'-P-CCNC: <15 U/L (ref 0–54)
ANION GAP SERPL CALCULATED.3IONS-SCNC: 8 MMOL/L (ref 4–13)
AST SERPL-CCNC: 26 U/L (ref 7–45)
BASOPHILS # BLD AUTO: 0.02 10*3/MM3 (ref 0–0.2)
BASOPHILS NFR BLD AUTO: 0.4 % (ref 0–2)
BILIRUB SERPL-MCNC: 0.7 MG/DL (ref 0.1–1)
BUN BLD-MCNC: 9 MG/DL (ref 5–21)
BUN/CREAT SERPL: 12 (ref 7–25)
CALCIUM SPEC-SCNC: 9.7 MG/DL (ref 8.4–10.4)
CHLORIDE SERPL-SCNC: 97 MMOL/L (ref 98–110)
CO2 SERPL-SCNC: 34 MMOL/L (ref 24–31)
CREAT BLD-MCNC: 0.75 MG/DL (ref 0.5–1.4)
DEPRECATED RDW RBC AUTO: 49.8 FL (ref 40–54)
EOSINOPHIL # BLD AUTO: 0.25 10*3/MM3 (ref 0–0.7)
EOSINOPHIL NFR BLD AUTO: 4.7 % (ref 0–4)
ERYTHROCYTE [DISTWIDTH] IN BLOOD BY AUTOMATED COUNT: 15.2 % (ref 12–15)
GFR SERPL CREATININE-BSD FRML MDRD: 106 ML/MIN/1.73
GLOBULIN UR ELPH-MCNC: 3.5 GM/DL
GLUCOSE BLD-MCNC: 111 MG/DL (ref 70–100)
HCT VFR BLD AUTO: 32.9 % (ref 40–52)
HGB BLD-MCNC: 10.3 G/DL (ref 14–18)
IMM GRANULOCYTES # BLD: 0.02 10*3/MM3 (ref 0–0.03)
IMM GRANULOCYTES NFR BLD: 0.4 % (ref 0–5)
LYMPHOCYTES # BLD AUTO: 0.49 10*3/MM3 (ref 0.72–4.86)
LYMPHOCYTES NFR BLD AUTO: 9.3 % (ref 15–45)
MCH RBC QN AUTO: 27.8 PG (ref 28–32)
MCHC RBC AUTO-ENTMCNC: 31.3 G/DL (ref 33–36)
MCV RBC AUTO: 88.7 FL (ref 82–95)
MONOCYTES # BLD AUTO: 0.36 10*3/MM3 (ref 0.19–1.3)
MONOCYTES NFR BLD AUTO: 6.8 % (ref 4–12)
NEUTROPHILS # BLD AUTO: 4.14 10*3/MM3 (ref 1.87–8.4)
NEUTROPHILS NFR BLD AUTO: 78.4 % (ref 39–78)
NRBC BLD MANUAL-RTO: 0 /100 WBC (ref 0–0)
PLATELET # BLD AUTO: 230 10*3/MM3 (ref 130–400)
PMV BLD AUTO: 9.8 FL (ref 6–12)
POTASSIUM BLD-SCNC: 4.1 MMOL/L (ref 3.5–5.3)
PROT SERPL-MCNC: 7.4 G/DL (ref 6.3–8.7)
RBC # BLD AUTO: 3.71 10*6/MM3 (ref 4.8–5.9)
SODIUM BLD-SCNC: 139 MMOL/L (ref 135–145)
WBC NRBC COR # BLD: 5.28 10*3/MM3 (ref 4.8–10.8)

## 2018-05-07 PROCEDURE — 80053 COMPREHEN METABOLIC PANEL: CPT | Performed by: SPECIALIST

## 2018-05-07 PROCEDURE — 85025 COMPLETE CBC W/AUTO DIFF WBC: CPT | Performed by: SPECIALIST

## 2018-05-07 PROCEDURE — 93010 ELECTROCARDIOGRAM REPORT: CPT | Performed by: INTERNAL MEDICINE

## 2018-05-07 PROCEDURE — 93005 ELECTROCARDIOGRAM TRACING: CPT

## 2018-05-07 NOTE — DISCHARGE INSTRUCTIONS
DAY OF SURGERY INSTRUCTIONS        YOUR SURGEON: dr summers    PROCEDURE: ***EXPLORATORY LAPAROTOMY , TAKEDOWN GASTROCUTANEOUS FISTULA    DATE OF SURGERY: ***5/11/2018    ARRIVAL TIME: AS DIRECTED BY OFFICE    DAY OF SURGERY TAKE ONLY THESE MEDICATIONS UNLESS OTHERWISE INSTRUCTED BY YOUR PHYSICIAN: ***none        MANAGING PAIN AFTER SURGERY    We know you are probably wondering what your pain will be like after surgery.  Following surgery it is unrealistic to expect you will not have pain.   Pain is how our bodies let us know that something is wrong or cautions us to be careful.  That said, our goal is to make your pain tolerable.    Methods we may use to treat your pain include (oral or IV medications, PCAs, epidurals, nerve blocks, etc.)   While some procedures require IV pain medications for a short time after surgery, transitioning to pain medications by mouth allows for better management of pain.   Your nurse will encourage you to take oral pain medications whenever possible.  IV medications work almost immediately, but only last a short while.  Taking medications by mouth allows for a more constant level of medication in your blood stream for a longer period of time.      Once your pain is out of control it is harder to get back under control.  It is important you are aware when your next dose of pain medication is due.  If you are admitted, your nurse may write the time of your next dose on the white board in your room to help you remember.      We are interested in your pain and encourage you to inform us about aggravating factors during your visit.   Many times a simple repositioning every few hours can make a big difference.    If your physician says it is okay, do not let your pain prevent you from getting out of bed. Be sure to call your nurse for assistance prior to getting up so you do not fall.      Before surgery, please decide your tolerable pain goal.  These faces help describe the pain ratings  we use on a 0-10 scale.   Be prepared to tell us your goal and whether or not you take pain or anxiety medications at home.          BEFORE YOU COME TO THE HOSPITAL  (Pre-op instructions)  • Do not eat, drink, smoke or chew gum after midnight the night before surgery.  This also includes no mints.  • Morning of surgery take only the medicines you have been instructed with a sip of water unless otherwise instructed  by your physician.  • Do not shave, wear makeup or dark nail polish.  • Remove all jewelry including rings.  • Leave anything you consider valuable at home.  • Leave your suitcase in the car until after your surgery.  • Bring the following with you if applicable:  o Picture ID and insurance, Medicare or Medicaid cards  o Co-pay/deductible required by insurance (cash, check, credit card)  o Copy of advance directive, living will or power-of- documents if not brought to PAT  o CPAP or BIPAP mask and tubing  o Relaxation aids (MP3 player, book, magazine)  • On the day of surgery check in at registration located at the main entrance of the hospital.       Outpatient Surgery Guidelines, Adult  Outpatient procedures are those for which the person having the procedure is allowed to go home the same day as the procedure. Various procedures are done on an outpatient basis. You should follow some general guidelines if you will be having an outpatient procedure.  LET YOUR HEALTH CARE PROVIDER KNOW ABOUT:  · Any allergies you have.  · All medicines you are taking, including vitamins, herbs, eye drops, creams, and over-the-counter medicines.  · Previous problems you or members of your family have had with the use of anesthetics.  · Any blood disorders you have.  · Previous surgeries you have had.  · Medical conditions you have.  RISKS AND COMPLICATIONS  Your health care provider will discuss possible risks and complications with you before surgery. Common risks and complications include:    · Problems due to  the use of anesthetics.  · Blood loss and replacement (does not apply to minor surgical procedures).  · Temporary increase in pain due to surgery.  · Uncorrected pain or problems that the surgery was meant to correct.  · Infection.  · New damage.  BEFORE THE PROCEDURE  · Ask your health care provider about changing or stopping your regular medicines. You may need to stop taking certain medicines in the days or weeks before the procedure.  · Stop smoking at least 2 weeks before surgery. This lowers your risk for complications during and after surgery. Ask your health care provider for help with this if needed.  · Eat your usual meals and a light supper the day before surgery. Continue fluid intake. Do not drink alcohol.  · Do not eat or drink after midnight the night before your surgery.   · Arrange for someone to take you home and to stay with you for 24 hours after the procedure. Medicine given for your procedure may affect your ability to drive or to care for yourself.  · Call your health care provider's office if you develop an illness or problem that may prevent you from safely having your procedure.  AFTER THE PROCEDURE  After surgery, you will be taken to a recovery area, where your progress will be monitored. If there are no complications, you will be allowed to go home when you are awake, stable, and taking fluids well. You may have numbness around the surgical site. Healing will take some time. You will have tenderness at the surgical site and may have some swelling and bruising. You may also have some nausea.  HOME CARE INSTRUCTIONS  · Do not drive for 24 hours, or as directed by your health care provider. Do not drive while taking prescription pain medicines.  · Do not drink alcohol for 24 hours.  · Do not make important decisions or sign legal documents for 24 hours.  · You may resume a normal diet and activities as directed.  · Do not lift anything heavier than 10 pounds (4.5 kg) or play contact sports  until your health care provider says it is okay.  · Change your bandages (dressings) as directed.  · Only take over-the-counter or prescription medicines as directed by your health care provider.  · Follow up with your health care provider as directed.  SEEK MEDICAL CARE IF:  · You have increased bleeding (more than a small spot) from the surgical site.  · You have redness, swelling, or increasing pain in the wound.  · You see pus coming from the wound.  · You have a fever.  · You notice a bad smell coming from the wound or dressing.  · You feel lightheaded or faint.  · You develop a rash.  · You have trouble breathing.  · You develop allergies.  MAKE SURE YOU:  · Understand these instructions.  · Will watch your condition.  · Will get help right away if you are not doing well or get worse.     This information is not intended to replace advice given to you by your health care provider. Make sure you discuss any questions you have with your health care provider.     Document Released: 09/12/2002 Document Revised: 05/03/2016 Document Reviewed: 05/22/2014  [employer] Interactive Patient Education ©2016 [employer]       Fall Prevention in Hospitals, Adult  As a hospital patient, your condition and the treatments you receive can increase your risk for falls. Some additional risk factors for falls in a hospital include:  · Being in an unfamiliar environment.  · Being on bed rest.  · Your surgery.  · Taking certain medicines.  · Your tubing requirements, such as intravenous (IV) therapy or catheters.  It is important that you learn how to decrease fall risks while at the hospital. Below are important tips that can help prevent falls.  SAFETY TIPS FOR PREVENTING FALLS  Talk about your risk of falling.  · Ask your health care provider why you are at risk for falling. Is it your medicine, illness, tubing placement, or something else?  · Make a plan with your health care provider to keep you safe from falls.  · Ask your  health care provider or pharmacist about side effects of your medicines. Some medicines can make you dizzy or affect your coordination.  Ask for help.  · Ask for help before getting out of bed. You may need to press your call button.  · Ask for assistance in getting safely to the toilet.  · Ask for a walker or cane to be put at your bedside. Ask that most of the side rails on your bed be placed up before your health care provider leaves the room.  · Ask family or friends to sit with you.  · Ask for things that are out of your reach, such as your glasses, hearing aids, telephone, bedside table, or call button.  Follow these tips to avoid falling:  · Stay lying or seated, rather than standing, while waiting for help.  · Wear rubber-soled slippers or shoes whenever you walk in the hospital.  · Avoid quick, sudden movements.  ¨ Change positions slowly.  ¨ Sit on the side of your bed before standing.  ¨ Stand up slowly and wait before you start to walk.  · Let your health care provider know if there is a spill on the floor.  · Pay careful attention to the medical equipment, electrical cords, and tubes around you.  · When you need help, use your call button by your bed or in the bathroom. Wait for one of your health care providers to help you.  · If you feel dizzy or unsure of your footing, return to bed and wait for assistance.  · Avoid being distracted by the TV, telephone, or another person in your room.  · Do not lean or support yourself on rolling objects, such as IV poles or bedside tables.     This information is not intended to replace advice given to you by your health care provider. Make sure you discuss any questions you have with your health care provider.     Document Released: 12/15/2001 Document Revised: 01/08/2016 Document Reviewed: 08/25/2013  [employer] Interactive Patient Education ©2016 [employer]       Surgical Site Infections FAQs  What is a Surgical Site Infection (SSI)?  A surgical site infection  is an infection that occurs after surgery in the part of the body where the surgery took place. Most patients who have surgery do not develop an infection. However, infections develop in about 1 to 3 out of every 100 patients who have surgery.  Some of the common symptoms of a surgical site infection are:  · Redness and pain around the area where you had surgery  · Drainage of cloudy fluid from your surgical wound  · Fever  Can SSIs be treated?  Yes. Most surgical site infections can be treated with antibiotics. The antibiotic given to you depends on the bacteria (germs) causing the infection. Sometimes patients with SSIs also need another surgery to treat the infection.  What are some of the things that hospitals are doing to prevent SSIs?  To prevent SSIs, doctors, nurses, and other healthcare providers:  · Clean their hands and arms up to their elbows with an antiseptic agent just before the surgery.  · Clean their hands with soap and water or an alcohol-based hand rub before and after caring for each patient.  · May remove some of your hair immediately before your surgery using electric clippers if the hair is in the same area where the procedure will occur. They should not shave you with a razor.  · Wear special hair covers, masks, gowns, and gloves during surgery to keep the surgery area clean.  · Give you antibiotics before your surgery starts. In most cases, you should get antibiotics within 60 minutes before the surgery starts and the antibiotics should be stopped within 24 hours after surgery.  · Clean the skin at the site of your surgery with a special soap that kills germs.  What can I do to help prevent SSIs?  Before your surgery:  · Tell your doctor about other medical problems you may have. Health problems such as allergies, diabetes, and obesity could affect your surgery and your treatment.  · Quit smoking. Patients who smoke get more infections. Talk to your doctor about how you can quit before your  surgery.  · Do not shave near where you will have surgery. Shaving with a razor can irritate your skin and make it easier to develop an infection.  At the time of your surgery:  · Speak up if someone tries to shave you with a razor before surgery. Ask why you need to be shaved and talk with your surgeon if you have any concerns.  · Ask if you will get antibiotics before surgery.  After your surgery:  · Make sure that your healthcare providers clean their hands before examining you, either with soap and water or an alcohol-based hand rub.  · If you do not see your providers clean their hands, please ask them to do so.  · Family and friends who visit you should not touch the surgical wound or dressings.  · Family and friends should clean their hands with soap and water or an alcohol-based hand rub before and after visiting you. If you do not see them clean their hands, ask them to clean their hands.  What do I need to do when I go home from the hospital?  · Before you go home, your doctor or nurse should explain everything you need to know about taking care of your wound. Make sure you understand how to care for your wound before you leave the hospital.  · Always clean your hands before and after caring for your wound.  · Before you go home, make sure you know who to contact if you have questions or problems after you get home.  · If you have any symptoms of an infection, such as redness and pain at the surgery site, drainage, or fever, call your doctor immediately.  If you have additional questions, please ask your doctor or nurse.  Developed and co-sponsored by The Society for Healthcare Epidemiology of [place] (SHEA); Infectious Diseases Society of [place] (IDSA); American Hospital Association; Association for Professionals in Infection Control and Epidemiology (APIC); Centers for Disease Control and Prevention (CDC); and The Joint Commission.     This information is not intended to replace advice given to you by  your health care provider. Make sure you discuss any questions you have with your health care provider.     Document Released: 12/23/2014 Document Revised: 01/08/2016 Document Reviewed: 03/02/2016  [employer] Interactive Interactive Patient Education ©2016 [employer]       [facility]  CHG 4% Patient Instruction Sheet    Preparing the Skin Before Surgery  Preparing or “prepping” skin before surgery can reduce the risk of infection at the surgical site. To make the process easier,[facility] has chosen 4% Chlorhexidine Gluconate (CHG) antiseptic solution.   The steps below outline the prepping process and should be carefully followed.                                                                                                                                                      Prep the skin at the following time(s):                                                      We recommend you shower the night before surgery, and again the morning of surgery with the 4% CHG antiseptic solution using half of the bottle and a cloth each time.  Dress in clean clothes/sleepwear after showering.  See instructions below for application.          Do not apply any lotions or moisturizers.       Do not shave the area to be prepped for at least 2 days prior to surgery.    Clipping the hair may be done immediately prior to your surgery at the hospital    if needed.    Directions:  Thoroughly rinse your body with water.  Apply 4% CHG to a cloth and wash skin gently, paying special attention to the operative site.  Rinse again thoroughly.  Once you have begun using this product do not apply anything else to your skin. If itching or redness persists, rinse affected areas and discontinue use.    When using this product:  • Keep out of eyes, ears, and mouth.  • If solution should contact these areas, rinse out promptly and thoroughly with water.  • For external use only.  • Do not use in genital area, on your face or  head.      PATIENT/FAMILY/RESPONSIBLE PARTY VERBALIZES UNDERSTANDING OF ABOVE EDUCATION.  COPY OF PAIN SCALE GIVEN AND REVIEWED WITH VERBALIZED UNDERSTANDING.

## 2018-05-10 ENCOUNTER — ANESTHESIA EVENT (OUTPATIENT)
Dept: PERIOP | Facility: HOSPITAL | Age: 58
End: 2018-05-10

## 2018-05-11 ENCOUNTER — HOSPITAL ENCOUNTER (INPATIENT)
Facility: HOSPITAL | Age: 58
LOS: 1 days | Discharge: HOME | End: 2018-05-12
Attending: SPECIALIST | Admitting: SPECIALIST

## 2018-05-11 ENCOUNTER — ANESTHESIA (OUTPATIENT)
Dept: PERIOP | Facility: HOSPITAL | Age: 58
End: 2018-05-11

## 2018-05-11 DIAGNOSIS — L98.8 FISTULA: ICD-10-CM

## 2018-05-11 PROCEDURE — 25010000002 MIDAZOLAM PER 1 MG: Performed by: ANESTHESIOLOGY

## 2018-05-11 PROCEDURE — 25010000002 MORPHINE SULFATE (PF) 2 MG/ML SOLUTION: Performed by: ANESTHESIOLOGY

## 2018-05-11 PROCEDURE — 25010000002 SUCCINYLCHOLINE PER 20 MG: Performed by: NURSE ANESTHETIST, CERTIFIED REGISTERED

## 2018-05-11 PROCEDURE — 0WJG0ZZ INSPECTION OF PERITONEAL CAVITY, OPEN APPROACH: ICD-10-PCS | Performed by: SPECIALIST

## 2018-05-11 PROCEDURE — 0JB80ZZ EXCISION OF ABDOMEN SUBCUTANEOUS TISSUE AND FASCIA, OPEN APPROACH: ICD-10-PCS | Performed by: SPECIALIST

## 2018-05-11 PROCEDURE — 25010000002 FENTANYL CITRATE (PF) 100 MCG/2ML SOLUTION: Performed by: NURSE ANESTHETIST, CERTIFIED REGISTERED

## 2018-05-11 PROCEDURE — 25010000002 ONDANSETRON PER 1 MG: Performed by: NURSE ANESTHETIST, CERTIFIED REGISTERED

## 2018-05-11 PROCEDURE — 25010000002 NEOSTIGMINE PER 0.5 MG: Performed by: NURSE ANESTHETIST, CERTIFIED REGISTERED

## 2018-05-11 PROCEDURE — 25010000002 FENTANYL CITRATE (PF) 250 MCG/5ML SOLUTION: Performed by: NURSE ANESTHETIST, CERTIFIED REGISTERED

## 2018-05-11 PROCEDURE — 25010000003 CEFAZOLIN PER 500 MG: Performed by: SPECIALIST

## 2018-05-11 PROCEDURE — 25010000002 PROPOFOL 10 MG/ML EMULSION: Performed by: NURSE ANESTHETIST, CERTIFIED REGISTERED

## 2018-05-11 PROCEDURE — 25010000002 MORPHINE PER 10 MG: Performed by: SPECIALIST

## 2018-05-11 PROCEDURE — 88304 TISSUE EXAM BY PATHOLOGIST: CPT | Performed by: SPECIALIST

## 2018-05-11 RX ORDER — ONDANSETRON 2 MG/ML
4 INJECTION INTRAMUSCULAR; INTRAVENOUS EVERY 6 HOURS PRN
Status: DISCONTINUED | OUTPATIENT
Start: 2018-05-11 | End: 2018-05-12 | Stop reason: HOSPADM

## 2018-05-11 RX ORDER — GLYCOPYRROLATE 0.2 MG/ML
INJECTION INTRAMUSCULAR; INTRAVENOUS AS NEEDED
Status: DISCONTINUED | OUTPATIENT
Start: 2018-05-11 | End: 2018-05-11 | Stop reason: SURG

## 2018-05-11 RX ORDER — ONDANSETRON 2 MG/ML
4 INJECTION INTRAMUSCULAR; INTRAVENOUS ONCE AS NEEDED
Status: DISCONTINUED | OUTPATIENT
Start: 2018-05-11 | End: 2018-05-11 | Stop reason: HOSPADM

## 2018-05-11 RX ORDER — MIDAZOLAM HYDROCHLORIDE 1 MG/ML
2 INJECTION INTRAMUSCULAR; INTRAVENOUS
Status: DISCONTINUED | OUTPATIENT
Start: 2018-05-11 | End: 2018-05-11 | Stop reason: HOSPADM

## 2018-05-11 RX ORDER — LABETALOL HYDROCHLORIDE 5 MG/ML
5 INJECTION, SOLUTION INTRAVENOUS
Status: DISCONTINUED | OUTPATIENT
Start: 2018-05-11 | End: 2018-05-11 | Stop reason: HOSPADM

## 2018-05-11 RX ORDER — OXYCODONE AND ACETAMINOPHEN 10; 325 MG/1; MG/1
1 TABLET ORAL ONCE AS NEEDED
Status: DISCONTINUED | OUTPATIENT
Start: 2018-05-11 | End: 2018-05-11 | Stop reason: HOSPADM

## 2018-05-11 RX ORDER — FENTANYL CITRATE 50 UG/ML
INJECTION, SOLUTION INTRAMUSCULAR; INTRAVENOUS AS NEEDED
Status: DISCONTINUED | OUTPATIENT
Start: 2018-05-11 | End: 2018-05-11 | Stop reason: SURG

## 2018-05-11 RX ORDER — MEPERIDINE HYDROCHLORIDE 50 MG/ML
12.5 INJECTION INTRAMUSCULAR; INTRAVENOUS; SUBCUTANEOUS
Status: DISCONTINUED | OUTPATIENT
Start: 2018-05-11 | End: 2018-05-11 | Stop reason: HOSPADM

## 2018-05-11 RX ORDER — PROPOFOL 10 MG/ML
VIAL (ML) INTRAVENOUS AS NEEDED
Status: DISCONTINUED | OUTPATIENT
Start: 2018-05-11 | End: 2018-05-11 | Stop reason: SURG

## 2018-05-11 RX ORDER — HYDROCODONE BITARTRATE AND ACETAMINOPHEN 10; 325 MG/1; MG/1
1 TABLET ORAL EVERY 6 HOURS PRN
Status: DISCONTINUED | OUTPATIENT
Start: 2018-05-11 | End: 2018-05-12 | Stop reason: HOSPADM

## 2018-05-11 RX ORDER — MAGNESIUM HYDROXIDE 1200 MG/15ML
LIQUID ORAL AS NEEDED
Status: DISCONTINUED | OUTPATIENT
Start: 2018-05-11 | End: 2018-05-11 | Stop reason: HOSPADM

## 2018-05-11 RX ORDER — SODIUM CHLORIDE 0.9 % (FLUSH) 0.9 %
1-10 SYRINGE (ML) INJECTION AS NEEDED
Status: DISCONTINUED | OUTPATIENT
Start: 2018-05-11 | End: 2018-05-11 | Stop reason: HOSPADM

## 2018-05-11 RX ORDER — DEXTROSE MONOHYDRATE 25 G/50ML
12.5 INJECTION, SOLUTION INTRAVENOUS AS NEEDED
Status: DISCONTINUED | OUTPATIENT
Start: 2018-05-11 | End: 2018-05-11 | Stop reason: HOSPADM

## 2018-05-11 RX ORDER — ONDANSETRON 4 MG/1
4 TABLET, FILM COATED ORAL EVERY 6 HOURS PRN
Status: DISCONTINUED | OUTPATIENT
Start: 2018-05-11 | End: 2018-05-12 | Stop reason: HOSPADM

## 2018-05-11 RX ORDER — MIDAZOLAM HYDROCHLORIDE 1 MG/ML
1 INJECTION INTRAMUSCULAR; INTRAVENOUS
Status: DISCONTINUED | OUTPATIENT
Start: 2018-05-11 | End: 2018-05-11 | Stop reason: HOSPADM

## 2018-05-11 RX ORDER — DIPHENHYDRAMINE HYDROCHLORIDE 50 MG/ML
12.5 INJECTION INTRAMUSCULAR; INTRAVENOUS
Status: DISCONTINUED | OUTPATIENT
Start: 2018-05-11 | End: 2018-05-11 | Stop reason: HOSPADM

## 2018-05-11 RX ORDER — LIDOCAINE HYDROCHLORIDE 20 MG/ML
INJECTION, SOLUTION INFILTRATION; PERINEURAL AS NEEDED
Status: DISCONTINUED | OUTPATIENT
Start: 2018-05-11 | End: 2018-05-11 | Stop reason: SURG

## 2018-05-11 RX ORDER — NALOXONE HCL 0.4 MG/ML
0.4 VIAL (ML) INJECTION AS NEEDED
Status: DISCONTINUED | OUTPATIENT
Start: 2018-05-11 | End: 2018-05-11 | Stop reason: HOSPADM

## 2018-05-11 RX ORDER — SODIUM CHLORIDE, SODIUM LACTATE, POTASSIUM CHLORIDE, CALCIUM CHLORIDE 600; 310; 30; 20 MG/100ML; MG/100ML; MG/100ML; MG/100ML
30 INJECTION, SOLUTION INTRAVENOUS CONTINUOUS
Status: DISCONTINUED | OUTPATIENT
Start: 2018-05-11 | End: 2018-05-12 | Stop reason: HOSPADM

## 2018-05-11 RX ORDER — FENTANYL CITRATE 50 UG/ML
25 INJECTION, SOLUTION INTRAMUSCULAR; INTRAVENOUS
Status: DISCONTINUED | OUTPATIENT
Start: 2018-05-11 | End: 2018-05-11 | Stop reason: HOSPADM

## 2018-05-11 RX ORDER — IPRATROPIUM BROMIDE AND ALBUTEROL SULFATE 2.5; .5 MG/3ML; MG/3ML
3 SOLUTION RESPIRATORY (INHALATION) ONCE AS NEEDED
Status: DISCONTINUED | OUTPATIENT
Start: 2018-05-11 | End: 2018-05-11 | Stop reason: HOSPADM

## 2018-05-11 RX ORDER — MORPHINE SULFATE 4 MG/ML
4 INJECTION, SOLUTION INTRAMUSCULAR; INTRAVENOUS
Status: DISCONTINUED | OUTPATIENT
Start: 2018-05-11 | End: 2018-05-12 | Stop reason: HOSPADM

## 2018-05-11 RX ORDER — ROCURONIUM BROMIDE 10 MG/ML
INJECTION, SOLUTION INTRAVENOUS AS NEEDED
Status: DISCONTINUED | OUTPATIENT
Start: 2018-05-11 | End: 2018-05-11 | Stop reason: SURG

## 2018-05-11 RX ORDER — METOPROLOL TARTRATE 5 MG/5ML
2.5 INJECTION INTRAVENOUS
Status: DISCONTINUED | OUTPATIENT
Start: 2018-05-11 | End: 2018-05-11 | Stop reason: HOSPADM

## 2018-05-11 RX ORDER — HYDRALAZINE HYDROCHLORIDE 20 MG/ML
5 INJECTION INTRAMUSCULAR; INTRAVENOUS
Status: DISCONTINUED | OUTPATIENT
Start: 2018-05-11 | End: 2018-05-11 | Stop reason: HOSPADM

## 2018-05-11 RX ORDER — SUCCINYLCHOLINE CHLORIDE 20 MG/ML
INJECTION INTRAMUSCULAR; INTRAVENOUS AS NEEDED
Status: DISCONTINUED | OUTPATIENT
Start: 2018-05-11 | End: 2018-05-11 | Stop reason: SURG

## 2018-05-11 RX ORDER — ONDANSETRON 2 MG/ML
INJECTION INTRAMUSCULAR; INTRAVENOUS AS NEEDED
Status: DISCONTINUED | OUTPATIENT
Start: 2018-05-11 | End: 2018-05-11 | Stop reason: SURG

## 2018-05-11 RX ORDER — MORPHINE SULFATE 2 MG/ML
2 INJECTION, SOLUTION INTRAMUSCULAR; INTRAVENOUS
Status: COMPLETED | OUTPATIENT
Start: 2018-05-11 | End: 2018-05-11

## 2018-05-11 RX ORDER — SODIUM CHLORIDE 0.9 % (FLUSH) 0.9 %
3 SYRINGE (ML) INJECTION AS NEEDED
Status: DISCONTINUED | OUTPATIENT
Start: 2018-05-11 | End: 2018-05-11 | Stop reason: HOSPADM

## 2018-05-11 RX ORDER — ONDANSETRON 4 MG/1
4 TABLET, ORALLY DISINTEGRATING ORAL EVERY 6 HOURS PRN
Status: DISCONTINUED | OUTPATIENT
Start: 2018-05-11 | End: 2018-05-12 | Stop reason: HOSPADM

## 2018-05-11 RX ORDER — DEXTROSE, SODIUM CHLORIDE, AND POTASSIUM CHLORIDE 5; .45; .15 G/100ML; G/100ML; G/100ML
75 INJECTION INTRAVENOUS CONTINUOUS
Status: DISCONTINUED | OUTPATIENT
Start: 2018-05-11 | End: 2018-05-12 | Stop reason: HOSPADM

## 2018-05-11 RX ORDER — SODIUM CHLORIDE, SODIUM LACTATE, POTASSIUM CHLORIDE, CALCIUM CHLORIDE 600; 310; 30; 20 MG/100ML; MG/100ML; MG/100ML; MG/100ML
1000 INJECTION, SOLUTION INTRAVENOUS CONTINUOUS
Status: DISCONTINUED | OUTPATIENT
Start: 2018-05-11 | End: 2018-05-12 | Stop reason: HOSPADM

## 2018-05-11 RX ORDER — SODIUM CHLORIDE, SODIUM LACTATE, POTASSIUM CHLORIDE, CALCIUM CHLORIDE 600; 310; 30; 20 MG/100ML; MG/100ML; MG/100ML; MG/100ML
9 INJECTION, SOLUTION INTRAVENOUS CONTINUOUS
Status: DISCONTINUED | OUTPATIENT
Start: 2018-05-11 | End: 2018-05-12 | Stop reason: HOSPADM

## 2018-05-11 RX ORDER — BUPIVACAINE HYDROCHLORIDE 5 MG/ML
INJECTION, SOLUTION EPIDURAL; INTRACAUDAL AS NEEDED
Status: DISCONTINUED | OUTPATIENT
Start: 2018-05-11 | End: 2018-05-11 | Stop reason: HOSPADM

## 2018-05-11 RX ORDER — NALOXONE HCL 0.4 MG/ML
0.4 VIAL (ML) INJECTION
Status: DISCONTINUED | OUTPATIENT
Start: 2018-05-11 | End: 2018-05-12 | Stop reason: HOSPADM

## 2018-05-11 RX ADMIN — FENTANYL CITRATE 100 MCG: 50 INJECTION INTRAMUSCULAR; INTRAVENOUS at 10:47

## 2018-05-11 RX ADMIN — Medication 1 G: at 10:31

## 2018-05-11 RX ADMIN — MORPHINE SULFATE 4 MG: 4 INJECTION INTRAVENOUS at 17:18

## 2018-05-11 RX ADMIN — Medication 3 MG: at 11:15

## 2018-05-11 RX ADMIN — FENTANYL CITRATE 50 MCG: 50 INJECTION, SOLUTION INTRAMUSCULAR; INTRAVENOUS at 10:40

## 2018-05-11 RX ADMIN — FENTANYL CITRATE 25 MCG: 50 INJECTION, SOLUTION INTRAMUSCULAR; INTRAVENOUS at 10:15

## 2018-05-11 RX ADMIN — SODIUM CHLORIDE, POTASSIUM CHLORIDE, SODIUM LACTATE AND CALCIUM CHLORIDE: 600; 310; 30; 20 INJECTION, SOLUTION INTRAVENOUS at 10:49

## 2018-05-11 RX ADMIN — SODIUM CHLORIDE, POTASSIUM CHLORIDE, SODIUM LACTATE AND CALCIUM CHLORIDE 30 ML/HR: 600; 310; 30; 20 INJECTION, SOLUTION INTRAVENOUS at 09:00

## 2018-05-11 RX ADMIN — MORPHINE SULFATE 2 MG: 2 INJECTION, SOLUTION INTRAMUSCULAR; INTRAVENOUS at 12:00

## 2018-05-11 RX ADMIN — MORPHINE SULFATE 4 MG: 4 INJECTION INTRAVENOUS at 14:59

## 2018-05-11 RX ADMIN — FENTANYL CITRATE 25 MCG: 50 INJECTION, SOLUTION INTRAMUSCULAR; INTRAVENOUS at 10:11

## 2018-05-11 RX ADMIN — FENTANYL CITRATE 50 MCG: 50 INJECTION INTRAMUSCULAR; INTRAVENOUS at 11:20

## 2018-05-11 RX ADMIN — PROPOFOL 120 MG: 10 INJECTION, EMULSION INTRAVENOUS at 10:11

## 2018-05-11 RX ADMIN — ONDANSETRON 4 MG: 4 TABLET, FILM COATED ORAL at 15:00

## 2018-05-11 RX ADMIN — MIDAZOLAM HYDROCHLORIDE 2 MG: 1 INJECTION, SOLUTION INTRAMUSCULAR; INTRAVENOUS at 09:27

## 2018-05-11 RX ADMIN — FENTANYL CITRATE 50 MCG: 50 INJECTION INTRAMUSCULAR; INTRAVENOUS at 11:16

## 2018-05-11 RX ADMIN — GLYCOPYRROLATE 0.4 MG: 0.2 INJECTION, SOLUTION INTRAMUSCULAR; INTRAVENOUS at 11:15

## 2018-05-11 RX ADMIN — LIDOCAINE HYDROCHLORIDE 100 MG: 20 INJECTION, SOLUTION INFILTRATION; PERINEURAL at 10:11

## 2018-05-11 RX ADMIN — FENTANYL CITRATE 50 MCG: 50 INJECTION INTRAMUSCULAR; INTRAVENOUS at 10:44

## 2018-05-11 RX ADMIN — SODIUM CHLORIDE, POTASSIUM CHLORIDE, SODIUM LACTATE AND CALCIUM CHLORIDE 1000 ML: 600; 310; 30; 20 INJECTION, SOLUTION INTRAVENOUS at 09:01

## 2018-05-11 RX ADMIN — HYDROCODONE BITARTRATE AND ACETAMINOPHEN 1 TABLET: 10; 325 TABLET ORAL at 14:01

## 2018-05-11 RX ADMIN — MORPHINE SULFATE 2 MG: 2 INJECTION, SOLUTION INTRAMUSCULAR; INTRAVENOUS at 12:48

## 2018-05-11 RX ADMIN — ROCURONIUM BROMIDE 5 MG: 10 INJECTION INTRAVENOUS at 10:11

## 2018-05-11 RX ADMIN — HYDROCODONE BITARTRATE AND ACETAMINOPHEN 1 TABLET: 10; 325 TABLET ORAL at 20:43

## 2018-05-11 RX ADMIN — MORPHINE SULFATE 2 MG: 2 INJECTION, SOLUTION INTRAMUSCULAR; INTRAVENOUS at 11:53

## 2018-05-11 RX ADMIN — ONDANSETRON HYDROCHLORIDE 4 MG: 2 SOLUTION INTRAMUSCULAR; INTRAVENOUS at 11:07

## 2018-05-11 RX ADMIN — POTASSIUM CHLORIDE, DEXTROSE MONOHYDRATE AND SODIUM CHLORIDE 75 ML/HR: 150; 5; 450 INJECTION, SOLUTION INTRAVENOUS at 17:14

## 2018-05-11 RX ADMIN — SUCCINYLCHOLINE CHLORIDE 140 MG: 20 INJECTION, SOLUTION INTRAMUSCULAR; INTRAVENOUS at 10:11

## 2018-05-11 RX ADMIN — PROPOFOL 30 MG: 10 INJECTION, EMULSION INTRAVENOUS at 10:41

## 2018-05-11 RX ADMIN — POTASSIUM CHLORIDE, DEXTROSE MONOHYDRATE AND SODIUM CHLORIDE 75 ML/HR: 150; 5; 450 INJECTION, SOLUTION INTRAVENOUS at 15:02

## 2018-05-11 RX ADMIN — LABETALOL HYDROCHLORIDE 5 MG: 5 INJECTION, SOLUTION INTRAVENOUS at 11:49

## 2018-05-11 RX ADMIN — MORPHINE SULFATE 2 MG: 2 INJECTION, SOLUTION INTRAMUSCULAR; INTRAVENOUS at 12:43

## 2018-05-11 RX ADMIN — CEFAZOLIN 1 G: 1 INJECTION, POWDER, FOR SOLUTION INTRAMUSCULAR; INTRAVENOUS at 18:40

## 2018-05-11 RX ADMIN — LIDOCAINE HYDROCHLORIDE 0.5 ML: 10 INJECTION, SOLUTION EPIDURAL; INFILTRATION; INTRACAUDAL; PERINEURAL at 09:00

## 2018-05-11 RX ADMIN — MORPHINE SULFATE 2 MG: 2 INJECTION, SOLUTION INTRAMUSCULAR; INTRAVENOUS at 12:36

## 2018-05-11 RX ADMIN — SODIUM CHLORIDE, POTASSIUM CHLORIDE, SODIUM LACTATE AND CALCIUM CHLORIDE 1000 ML: 600; 310; 30; 20 INJECTION, SOLUTION INTRAVENOUS at 13:13

## 2018-05-11 NOTE — ANESTHESIA POSTPROCEDURE EVALUATION
"Patient: David Bullock    Procedure Summary     Date:  05/11/18 Room / Location:   [facility] OR 06 /  [facility]    Anesthesia Start:  1009 Anesthesia Stop:  1138    Procedure:  EXPLORATORY LAPAROTOMY , TAKEDOWN GASTROCUTANEOUS  FISTULA (N/A Abdomen) Diagnosis:  (GASTROCUTANEOUS FISTULA )    Surgeon:  Nancie Silveira MD Provider:  Charlie James CRNA    Anesthesia Type:  general ASA Status:  3          Anesthesia Type: general  Last vitals  BP   (!) 191/56 (05/11/18 1430)   Temp   97.3 °F (36.3 °C) (05/11/18 1430)   Pulse   (!) 45 (05/11/18 1430)   Resp   16 (05/11/18 1430)     SpO2   99 % (05/11/18 1430)     Post Anesthesia Care and Evaluation    Patient location during evaluation: PACU  Patient participation: complete - patient participated  Level of consciousness: awake and alert  Pain management: adequate  Airway patency: patent  Anesthetic complications: No anesthetic complications    Cardiovascular status: acceptable  Respiratory status: acceptable  Hydration status: acceptable    Comments: Blood pressure (!) 191/56, pulse (!) 45, temperature 97.3 °F (36.3 °C), temperature source Oral, resp. rate 16, height 179 cm (70.47\"), weight 62.1 kg (136 lb 14.5 oz), SpO2 99 %.    Pt discharged from PACU based on ragini score >8      "

## 2018-05-11 NOTE — ANESTHESIA PROCEDURE NOTES
Airway  Urgency: elective    Airway not difficult    General Information and Staff    Patient location during procedure: OR  CRNA: NICKY MCCULLOUGH    Indications and Patient Condition  Indications for airway management: airway protection    Preoxygenated: yes  Mask difficulty assessment: 1 - vent by mask    Final Airway Details  Final airway type: endotracheal airway      Successful airway: ETT  Cuffed: yes   Successful intubation technique: video laryngoscopy  Facilitating devices/methods: intubating stylet  Endotracheal tube insertion site: oral  Blade: Lara  Blade size: #3  ETT size: 7.0 mm  Cormack-Lehane Classification: grade I - full view of glottis  Placement verified by: chest auscultation and capnometry   Cuff volume (mL): 8  Measured from: gums  ETT to gums (cm): 20  Number of attempts at approach: 1

## 2018-05-11 NOTE — ANESTHESIA PREPROCEDURE EVALUATION
Anesthesia Evaluation     Patient summary reviewed   no history of anesthetic complications:  NPO Solid Status: > 8 hours             Airway   Mallampati: I  TM distance: <3 FB  Neck ROM: limited  Possible difficult intubation  Dental    (+) edentulous    Pulmonary    (-) asthma, sleep apnea, not a smoker  Cardiovascular   Exercise tolerance: good (4-7 METS)    ECG reviewed    (+)  carotid artery disease (left stent on plavix)  (-) pacemaker, past MI, angina, cardiac stents      Neuro/Psych  (-) seizures, TIA, CVA  GI/Hepatic/Renal/Endo    (-) liver disease, no renal disease, diabetes    Musculoskeletal     Abdominal    Substance History      OB/GYN          Other      history of cancer    ROS/Med Hx Other: S/p radical neck/free flap for oral cancer.                    Anesthesia Plan    ASA 3     general     intravenous induction   Anesthetic plan and risks discussed with patient.

## 2018-05-11 NOTE — OP NOTE
Nancie Silveira MD Operative Note    David Bullock  5/11/2018    Pre-op Diagnosis:   GASTROCUTANEOUS FISTULA     Post-op Diagnosis:     same    Procedure/CPT® Codes:      Procedure(s):  EXPLORATORY LAPAROTOMY , TAKEDOWN GASTROCUTANEOUS  FISTULA    Surgeon(s):  Nancie Silveira MD    Anesthesia: General    Staff:   Circulator: Stefan Kimble RN  Scrub Person: Yesy Fields; Janelle Tracy  Assistant: Meryl Solis    Estimated Blood Loss: minimal    Specimens:                ID Type Source Tests Collected by Time   A : FISTULA FOR PERMANENT PLEASE Tissue Abdominal Wall TISSUE PATHOLOGY EXAM Nancie Silveira MD 5/11/2018 1052         Drains:      Indications: Stent fistula after G-tube removal 9 months ago    Findings: Fistulous tract from body of stomach    Complications: none    Procedure: The patient was brought to the operating room and placed in the supine position.  After induction of general anesthesia and infusion of IV antibiotics, the patient was prepped and draped in the usual sterile fashion.  Midline incision was made in the abdomen entered.  The anterior wall of the stomach was attached to the intra-abdominal wall.  This was taken down with cautery.  The fistulous track was elevated then excised with the RONY stapler.  The staple line was oversewn with 2-0 silk seromuscular sutures.  The fistulous track on the skin was excised down to the fascia.  We then closed the fascia opening with interrupted 0 PDS both at the fistulous tract site in the midline incision site.  Wounds irrigated then closed with 2-0 Vicryl and 4-0 Vicryl.  Dressing was placed patient was awakened and transferred to the cover him stable condition having tolerated the procedure well.  At the end of procedure all counts were correct.    Nancie Silveira MD     Date: 5/11/2018  Time: 11:17 AM

## 2018-05-12 VITALS
HEART RATE: 65 BPM | TEMPERATURE: 98.1 F | OXYGEN SATURATION: 91 % | BODY MASS INDEX: 19.6 KG/M2 | DIASTOLIC BLOOD PRESSURE: 97 MMHG | SYSTOLIC BLOOD PRESSURE: 134 MMHG | RESPIRATION RATE: 16 BRPM | WEIGHT: 136.91 LBS | HEIGHT: 70 IN

## 2018-05-12 PROCEDURE — 25010000002 MORPHINE PER 10 MG: Performed by: SPECIALIST

## 2018-05-12 PROCEDURE — 25010000003 CEFAZOLIN PER 500 MG: Performed by: SPECIALIST

## 2018-05-12 PROCEDURE — 25010000002 ENOXAPARIN PER 10 MG: Performed by: SPECIALIST

## 2018-05-12 RX ORDER — HYDROCODONE BITARTRATE AND ACETAMINOPHEN 10; 325 MG/1; MG/1
1 TABLET ORAL EVERY 6 HOURS PRN
Qty: 30 TABLET | Refills: 0 | Status: SHIPPED | OUTPATIENT
Start: 2018-05-12 | End: 2018-06-07 | Stop reason: SDUPTHER

## 2018-05-12 RX ADMIN — ENOXAPARIN SODIUM 40 MG: 40 INJECTION SUBCUTANEOUS at 08:17

## 2018-05-12 RX ADMIN — MORPHINE SULFATE 4 MG: 4 INJECTION INTRAVENOUS at 07:59

## 2018-05-12 RX ADMIN — HYDROCODONE BITARTRATE AND ACETAMINOPHEN 1 TABLET: 10; 325 TABLET ORAL at 09:32

## 2018-05-12 RX ADMIN — POTASSIUM CHLORIDE, DEXTROSE MONOHYDRATE AND SODIUM CHLORIDE 75 ML/HR: 150; 5; 450 INJECTION, SOLUTION INTRAVENOUS at 00:14

## 2018-05-12 RX ADMIN — MORPHINE SULFATE 4 MG: 4 INJECTION INTRAVENOUS at 00:15

## 2018-05-12 RX ADMIN — CEFAZOLIN 1 G: 1 INJECTION, POWDER, FOR SOLUTION INTRAMUSCULAR; INTRAVENOUS at 02:40

## 2018-05-12 RX ADMIN — HYDROCODONE BITARTRATE AND ACETAMINOPHEN 1 TABLET: 10; 325 TABLET ORAL at 02:40

## 2018-05-12 NOTE — PROGRESS NOTES
Carina Garcia MD FACS  Progress Note     LOS: 1 day   Patient Care Team:  Dio Baer MD as PCP - General  SHANNA Greco as PCP - Family Medicine  Good Rodríguez MD as PCP - Claims Attributed      Subjective     Interval History:      alejandro fulls.  Wants to go home     Objective     Vital Signs  Temp:  [97.1 °F (36.2 °C)-98.1 °F (36.7 °C)] 98.1 °F (36.7 °C)  Heart Rate:  [40-72] 65  Resp:  [11-22] 16  BP: (134-214)/(53-97) 134/97    Physical Exam:  General appearance - alert, well appearing, and in no distress  Abdomen - soft, appropriately tender clean      Results Review:    Lab Results (last 24 hours)     Procedure Component Value Units Date/Time    Tissue Pathology Exam [[id number]] Collected:  05/11/18 1052    Specimen:  Tissue from Abdominal Wall Updated:  05/11/18 1306        Imaging Results (last 24 hours)     ** No results found for the last 24 hours. **            Assessment/Plan       home      Carina Garcia MD  05/12/18  9:00 AM

## 2018-05-12 NOTE — PLAN OF CARE
Problem: Patient Care Overview  Goal: Plan of Care Review  Outcome: Ongoing (interventions implemented as appropriate)   05/12/18 0349   Coping/Psychosocial   Plan of Care Reviewed With patient   Plan of Care Review   Progress no change     Goal: Individualization and Mutuality  Outcome: Ongoing (interventions implemented as appropriate)    Goal: Discharge Needs Assessment  Outcome: Ongoing (interventions implemented as appropriate)    Goal: Interprofessional Rounds/Family Conf  Outcome: Ongoing (interventions implemented as appropriate)      Problem: Surgery Nonspecified (Adult)  Goal: Signs and Symptoms of Listed Potential Problems Will be Absent, Minimized or Managed (Surgery Nonspecified)  Outcome: Ongoing (interventions implemented as appropriate)    Goal: Anesthesia/Sedation Recovery  Outcome: Ongoing (interventions implemented as appropriate)

## 2018-05-14 LAB
CYTO UR: NORMAL
LAB AP CASE REPORT: NORMAL
LAB AP CLINICAL INFORMATION: NORMAL
Lab: NORMAL
PATH REPORT.FINAL DX SPEC: NORMAL
PATH REPORT.GROSS SPEC: NORMAL

## 2018-05-23 DIAGNOSIS — I65.29 STENOSIS OF CAROTID ARTERY, UNSPECIFIED LATERALITY: ICD-10-CM

## 2018-05-23 RX ORDER — CLOPIDOGREL BISULFATE 75 MG/1
75 TABLET ORAL DAILY
Qty: 30 TABLET | Refills: 1 | Status: SHIPPED | OUTPATIENT
Start: 2018-05-23 | End: 2018-05-30 | Stop reason: SDUPTHER

## 2018-05-23 NOTE — DISCHARGE SUMMARY
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  Hospital course postoperatively was uneventful.  Patient will be discharged to home.  See medication reconciliation for medications at discharge.    Procedures Performed  Procedure(s):  EXPLORATORY LAPAROTOMY , TAKEDOWN GASTROCUTANEOUS  FISTULA       Consults:   Consults     No orders found from 4/12/2018 to 5/12/2018.          Condition on Discharge:  stable      Discharge Disposition  Home or Self Care    Discharge Medications   David Bullock   Home Medication Instructions GA:[id number]    Printed on:05/23/18 1020   Medication Information                      clopidogrel (PLAVIX) 75 MG tablet  Take 75 mg by mouth Daily.             HYDROcodone-acetaminophen (NORCO)  MG per tablet  Take 1 tablet by mouth Every 6 (Six) Hours As Needed for Moderate Pain .                 Discharge Diet:     Activity at Discharge:     Follow-up Appointments  Future Appointments  Date Time Provider Department Center   7/24/2018 2:45 PM  [facility]  [facility]   7/24/2018 3:15 PM Ena Kelsey MD [facility]         Test Results Pending at Discharge       Nancie Silveira MD  05/23/18  10:20 AM    Time: Time spent at discharge 30 minutes

## 2018-05-30 ENCOUNTER — OFFICE VISIT (OUTPATIENT)
Dept: FAMILY MEDICINE CLINIC | Age: 58
End: 2018-05-30
Payer: MEDICARE

## 2018-05-30 VITALS
WEIGHT: 133.4 LBS | HEART RATE: 61 BPM | SYSTOLIC BLOOD PRESSURE: 125 MMHG | HEIGHT: 70 IN | BODY MASS INDEX: 19.1 KG/M2 | TEMPERATURE: 98.6 F | OXYGEN SATURATION: 98 % | DIASTOLIC BLOOD PRESSURE: 60 MMHG

## 2018-05-30 DIAGNOSIS — Z11.4 SCREENING FOR HIV WITHOUT PRESENCE OF RISK FACTORS: ICD-10-CM

## 2018-05-30 DIAGNOSIS — R79.89 ABNORMAL THYROID BLOOD TEST: ICD-10-CM

## 2018-05-30 DIAGNOSIS — Z00.00 PREVENTATIVE HEALTH CARE: ICD-10-CM

## 2018-05-30 DIAGNOSIS — Z12.5 SCREENING FOR PROSTATE CANCER: ICD-10-CM

## 2018-05-30 DIAGNOSIS — I65.29 STENOSIS OF CAROTID ARTERY, UNSPECIFIED LATERALITY: ICD-10-CM

## 2018-05-30 DIAGNOSIS — D64.9 ANEMIA, UNSPECIFIED TYPE: Primary | ICD-10-CM

## 2018-05-30 DIAGNOSIS — Z11.59 ENCOUNTER FOR HEPATITIS C SCREENING TEST FOR LOW RISK PATIENT: ICD-10-CM

## 2018-05-30 DIAGNOSIS — Z00.00 PREVENTATIVE HEALTH CARE: Primary | ICD-10-CM

## 2018-05-30 LAB
ALBUMIN SERPL-MCNC: 3.8 G/DL (ref 3.5–5.2)
ALP BLD-CCNC: 87 U/L (ref 40–130)
ALT SERPL-CCNC: 8 U/L (ref 5–41)
ANION GAP SERPL CALCULATED.3IONS-SCNC: 17 MMOL/L (ref 7–19)
AST SERPL-CCNC: 22 U/L (ref 5–40)
BASOPHILS ABSOLUTE: 0.1 K/UL (ref 0–0.2)
BASOPHILS RELATIVE PERCENT: 1.3 % (ref 0–1)
BILIRUB SERPL-MCNC: 0.4 MG/DL (ref 0.2–1.2)
BILIRUBIN URINE: NEGATIVE
BLOOD, URINE: NEGATIVE
BUN BLDV-MCNC: 12 MG/DL (ref 8–23)
CALCIUM SERPL-MCNC: 9.4 MG/DL (ref 8.8–10.2)
CHLORIDE BLD-SCNC: 99 MMOL/L (ref 98–111)
CHOLESTEROL, TOTAL: 194 MG/DL (ref 160–199)
CLARITY: CLEAR
CO2: 24 MMOL/L (ref 22–29)
COLOR: YELLOW
CREAT SERPL-MCNC: 0.7 MG/DL (ref 0.5–1.2)
EOSINOPHILS ABSOLUTE: 0.1 K/UL (ref 0–0.6)
EOSINOPHILS RELATIVE PERCENT: 2.2 % (ref 0–5)
GFR NON-AFRICAN AMERICAN: >60
GLUCOSE BLD-MCNC: 85 MG/DL (ref 74–109)
GLUCOSE URINE: NEGATIVE MG/DL
HCT VFR BLD CALC: 34.4 % (ref 42–52)
HDLC SERPL-MCNC: 47 MG/DL (ref 55–121)
HEMOGLOBIN: 10.5 G/DL (ref 14–18)
HEPATITIS C ANTIBODY INTERPRETATION: NORMAL
KETONES, URINE: NEGATIVE MG/DL
LDL CHOLESTEROL CALCULATED: 125 MG/DL
LEUKOCYTE ESTERASE, URINE: NEGATIVE
LYMPHOCYTES ABSOLUTE: 1.1 K/UL (ref 1.1–4.5)
LYMPHOCYTES RELATIVE PERCENT: 19.4 % (ref 20–40)
MCH RBC QN AUTO: 27.5 PG (ref 27–31)
MCHC RBC AUTO-ENTMCNC: 30.5 G/DL (ref 33–37)
MCV RBC AUTO: 90.1 FL (ref 80–94)
MONOCYTES ABSOLUTE: 0.6 K/UL (ref 0–0.9)
MONOCYTES RELATIVE PERCENT: 10.4 % (ref 0–10)
NEUTROPHILS ABSOLUTE: 3.6 K/UL (ref 1.5–7.5)
NEUTROPHILS RELATIVE PERCENT: 66.5 % (ref 50–65)
NITRITE, URINE: NEGATIVE
PDW BLD-RTO: 15.5 % (ref 11.5–14.5)
PH UA: 6.5
PLATELET # BLD: 553 K/UL (ref 130–400)
PMV BLD AUTO: 9.6 FL (ref 9.4–12.4)
POTASSIUM SERPL-SCNC: 4.9 MMOL/L (ref 3.5–5)
PROSTATE SPECIFIC ANTIGEN: 0.71 NG/ML (ref 0–4)
PROTEIN UA: NEGATIVE MG/DL
RAPID HIV 1&2: NORMAL
RBC # BLD: 3.82 M/UL (ref 4.7–6.1)
SODIUM BLD-SCNC: 140 MMOL/L (ref 136–145)
SPECIFIC GRAVITY UA: 1.01
T4 FREE: 0.8 NG/DL (ref 0.9–1.7)
TOTAL PROTEIN: 7.4 G/DL (ref 6.6–8.7)
TRIGL SERPL-MCNC: 109 MG/DL (ref 0–149)
TSH SERPL DL<=0.05 MIU/L-ACNC: 2.58 UIU/ML (ref 0.27–4.2)
URINE REFLEX TO CULTURE: NORMAL
UROBILINOGEN, URINE: 0.2 E.U./DL
WBC # BLD: 5.5 K/UL (ref 4.8–10.8)

## 2018-05-30 PROCEDURE — G8598 ASA/ANTIPLAT THER USED: HCPCS | Performed by: NURSE PRACTITIONER

## 2018-05-30 PROCEDURE — 3017F COLORECTAL CA SCREEN DOC REV: CPT | Performed by: NURSE PRACTITIONER

## 2018-05-30 PROCEDURE — 99213 OFFICE O/P EST LOW 20 MIN: CPT | Performed by: NURSE PRACTITIONER

## 2018-05-30 PROCEDURE — G8420 CALC BMI NORM PARAMETERS: HCPCS | Performed by: NURSE PRACTITIONER

## 2018-05-30 PROCEDURE — 1036F TOBACCO NON-USER: CPT | Performed by: NURSE PRACTITIONER

## 2018-05-30 PROCEDURE — G8427 DOCREV CUR MEDS BY ELIG CLIN: HCPCS | Performed by: NURSE PRACTITIONER

## 2018-05-30 RX ORDER — CLOPIDOGREL BISULFATE 75 MG/1
75 TABLET ORAL DAILY
Qty: 30 TABLET | Refills: 11 | Status: SHIPPED | OUTPATIENT
Start: 2018-05-30 | End: 2019-06-12 | Stop reason: SDUPTHER

## 2018-05-30 RX ORDER — PANTOPRAZOLE SODIUM 40 MG/1
40 TABLET, DELAYED RELEASE ORAL DAILY
Qty: 30 TABLET | Refills: 1 | Status: CANCELLED | OUTPATIENT
Start: 2018-05-30

## 2018-05-30 ASSESSMENT — ENCOUNTER SYMPTOMS
SHORTNESS OF BREATH: 0
CONSTIPATION: 0
DIARRHEA: 0

## 2018-05-30 ASSESSMENT — PATIENT HEALTH QUESTIONNAIRE - PHQ9
SUM OF ALL RESPONSES TO PHQ9 QUESTIONS 1 & 2: 0
1. LITTLE INTEREST OR PLEASURE IN DOING THINGS: 0
2. FEELING DOWN, DEPRESSED OR HOPELESS: 0
SUM OF ALL RESPONSES TO PHQ QUESTIONS 1-9: 0

## 2018-06-07 RX ORDER — HYDROCODONE BITARTRATE AND ACETAMINOPHEN 10; 325 MG/1; MG/1
1 TABLET ORAL EVERY 6 HOURS PRN
Qty: 120 TABLET | Refills: 0 | Status: SHIPPED | OUTPATIENT
Start: 2018-06-07 | End: 2018-07-09 | Stop reason: SDUPTHER

## 2018-06-07 NOTE — TELEPHONE ENCOUNTER
Patient presents to office for refill of his pain medication Norco, discussed with Dr Hines he gave verbal consent to write script and route to him to sign off on. Patient to carry script from office

## 2018-06-26 DIAGNOSIS — R79.89 ABNORMAL THYROID BLOOD TEST: ICD-10-CM

## 2018-06-26 DIAGNOSIS — D64.9 ANEMIA, UNSPECIFIED TYPE: ICD-10-CM

## 2018-06-26 LAB
BASOPHILS ABSOLUTE: 0 K/UL (ref 0–0.2)
BASOPHILS RELATIVE PERCENT: 0.6 % (ref 0–1)
EOSINOPHILS ABSOLUTE: 0.2 K/UL (ref 0–0.6)
EOSINOPHILS RELATIVE PERCENT: 2.1 % (ref 0–5)
FOLATE: 3 NG/ML (ref 4.5–32.2)
HCT VFR BLD CALC: 32.1 % (ref 42–52)
HEMOGLOBIN: 9.9 G/DL (ref 14–18)
IRON: 46 UG/DL (ref 59–158)
LYMPHOCYTES ABSOLUTE: 1 K/UL (ref 1.1–4.5)
LYMPHOCYTES RELATIVE PERCENT: 13.8 % (ref 20–40)
MCH RBC QN AUTO: 28 PG (ref 27–31)
MCHC RBC AUTO-ENTMCNC: 30.8 G/DL (ref 33–37)
MCV RBC AUTO: 90.7 FL (ref 80–94)
MONOCYTES ABSOLUTE: 0.4 K/UL (ref 0–0.9)
MONOCYTES RELATIVE PERCENT: 6.1 % (ref 0–10)
NEUTROPHILS ABSOLUTE: 5.4 K/UL (ref 1.5–7.5)
NEUTROPHILS RELATIVE PERCENT: 76.8 % (ref 50–65)
PDW BLD-RTO: 16.7 % (ref 11.5–14.5)
PLATELET # BLD: 519 K/UL (ref 130–400)
PMV BLD AUTO: 9.4 FL (ref 9.4–12.4)
RBC # BLD: 3.54 M/UL (ref 4.7–6.1)
RETICULOCYTE ABSOLUTE COUNT: 0.07 M/UL (ref 0.03–0.12)
RETICULOCYTE COUNT PCT: 1.89 % (ref 0.5–1.5)
T4 FREE: 0.8 NG/DL (ref 0.9–1.7)
TOTAL IRON BINDING CAPACITY: 353 UG/DL (ref 250–400)
TSH SERPL DL<=0.05 MIU/L-ACNC: 2.03 UIU/ML (ref 0.27–4.2)
VITAMIN B-12: 659 PG/ML (ref 211–946)
WBC # BLD: 7.1 K/UL (ref 4.8–10.8)

## 2018-06-28 LAB — THYROID PEROXIDASE (TPO) ABS: 8.8 IU/ML (ref 0–9)

## 2018-07-09 RX ORDER — HYDROCODONE BITARTRATE AND ACETAMINOPHEN 10; 325 MG/1; MG/1
1 TABLET ORAL EVERY 6 HOURS PRN
Qty: 120 TABLET | Refills: 0 | Status: SHIPPED | OUTPATIENT
Start: 2018-07-09 | End: 2018-08-08 | Stop reason: SDUPTHER

## 2018-07-09 NOTE — TELEPHONE ENCOUNTER
Patient presents to office for refill of his pain medication, Norco, he does have dx of Head & Neck Cancer. Discussed with Dr Hines he gave verbal order for the refill

## 2018-07-23 DIAGNOSIS — C76.0 MALIGNANT NEOPLASM OF HEAD, FACE, AND NECK (HCC): Primary | ICD-10-CM

## 2018-07-24 ENCOUNTER — TELEPHONE (OUTPATIENT)
Dept: ONCOLOGY | Facility: CLINIC | Age: 58
End: 2018-07-24

## 2018-07-24 ENCOUNTER — OFFICE VISIT (OUTPATIENT)
Dept: ONCOLOGY | Facility: CLINIC | Age: 58
End: 2018-07-24

## 2018-07-24 ENCOUNTER — APPOINTMENT (OUTPATIENT)
Dept: LAB | Facility: HOSPITAL | Age: 58
End: 2018-07-24

## 2018-07-24 ENCOUNTER — HOSPITAL ENCOUNTER (OUTPATIENT)
Dept: GENERAL RADIOLOGY | Facility: HOSPITAL | Age: 58
Discharge: HOME | End: 2018-07-24
Attending: INTERNAL MEDICINE | Admitting: INTERNAL MEDICINE

## 2018-07-24 VITALS
HEIGHT: 70 IN | OXYGEN SATURATION: 90 % | SYSTOLIC BLOOD PRESSURE: 130 MMHG | TEMPERATURE: 97.4 F | RESPIRATION RATE: 18 BRPM | HEART RATE: 71 BPM | WEIGHT: 137 LBS | BODY MASS INDEX: 19.61 KG/M2 | DIASTOLIC BLOOD PRESSURE: 74 MMHG

## 2018-07-24 DIAGNOSIS — C76.0 HEAD AND NECK CANCER (HCC): ICD-10-CM

## 2018-07-24 DIAGNOSIS — C76.0 HEAD AND NECK CANCER (HCC): Primary | ICD-10-CM

## 2018-07-24 DIAGNOSIS — D64.9 ANEMIA, UNSPECIFIED TYPE: Primary | ICD-10-CM

## 2018-07-24 LAB
ALBUMIN SERPL-MCNC: 3.9 G/DL (ref 3.5–5)
ALBUMIN/GLOB SERPL: 1.3 G/DL (ref 1.1–2.5)
ALP SERPL-CCNC: 75 U/L (ref 24–120)
ALT SERPL W P-5'-P-CCNC: 23 U/L (ref 0–54)
ANION GAP SERPL CALCULATED.3IONS-SCNC: 10 MMOL/L (ref 4–13)
AST SERPL-CCNC: 82 U/L (ref 7–45)
BASOPHILS # BLD AUTO: 0.01 10*3/MM3 (ref 0–0.2)
BASOPHILS NFR BLD AUTO: 0.1 % (ref 0–2)
BILIRUB SERPL-MCNC: 0.3 MG/DL (ref 0.1–1)
BUN BLD-MCNC: 18 MG/DL (ref 5–21)
BUN/CREAT SERPL: 21.2 (ref 7–25)
CALCIUM SPEC-SCNC: 9.3 MG/DL (ref 8.4–10.4)
CHLORIDE SERPL-SCNC: 101 MMOL/L (ref 98–110)
CO2 SERPL-SCNC: 30 MMOL/L (ref 24–31)
CREAT BLD-MCNC: 0.85 MG/DL (ref 0.5–1.4)
DEPRECATED RDW RBC AUTO: 54.6 FL (ref 40–54)
EOSINOPHIL # BLD AUTO: 0.16 10*3/MM3 (ref 0–0.7)
EOSINOPHIL NFR BLD AUTO: 1.9 % (ref 0–4)
ERYTHROCYTE [DISTWIDTH] IN BLOOD BY AUTOMATED COUNT: 16.2 % (ref 12–15)
FOLATE SERPL-MCNC: 4.84 NG/ML
GFR SERPL CREATININE-BSD FRML MDRD: 92 ML/MIN/1.73
GLOBULIN UR ELPH-MCNC: 3 GM/DL
GLUCOSE BLD-MCNC: 97 MG/DL (ref 70–100)
HCT VFR BLD AUTO: 29.7 % (ref 40–52)
HGB BLD-MCNC: 9 G/DL (ref 14–18)
HOLD SPECIMEN: NORMAL
HOLD SPECIMEN: NORMAL
IMM GRANULOCYTES # BLD: 0.04 10*3/MM3 (ref 0–0.03)
IMM GRANULOCYTES NFR BLD: 0.5 % (ref 0–5)
IRON 24H UR-MRATE: 49 MCG/DL (ref 42–180)
IRON SATN MFR SERPL: 14 % (ref 20–45)
LYMPHOCYTES # BLD AUTO: 0.46 10*3/MM3 (ref 0.72–4.86)
LYMPHOCYTES NFR BLD AUTO: 5.3 % (ref 15–45)
MCH RBC QN AUTO: 27.5 PG (ref 28–32)
MCHC RBC AUTO-ENTMCNC: 30.3 G/DL (ref 33–36)
MCV RBC AUTO: 90.8 FL (ref 82–95)
MONOCYTES # BLD AUTO: 0.42 10*3/MM3 (ref 0.19–1.3)
MONOCYTES NFR BLD AUTO: 4.9 % (ref 4–12)
NEUTROPHILS # BLD AUTO: 7.53 10*3/MM3 (ref 1.87–8.4)
NEUTROPHILS NFR BLD AUTO: 87.3 % (ref 39–78)
NRBC BLD MANUAL-RTO: 0 /100 WBC (ref 0–0)
PLATELET # BLD AUTO: 475 10*3/MM3 (ref 130–400)
PMV BLD AUTO: 9.1 FL (ref 6–12)
POTASSIUM BLD-SCNC: 4 MMOL/L (ref 3.5–5.3)
PROT SERPL-MCNC: 6.9 G/DL (ref 6.3–8.7)
RBC # BLD AUTO: 3.27 10*6/MM3 (ref 4.8–5.9)
SODIUM BLD-SCNC: 141 MMOL/L (ref 135–145)
TIBC SERPL-MCNC: 354 MCG/DL (ref 225–420)
VIT B12 BLD-MCNC: 426 PG/ML (ref 239–931)
WBC NRBC COR # BLD: 8.62 10*3/MM3 (ref 4.8–10.8)

## 2018-07-24 PROCEDURE — 83550 IRON BINDING TEST: CPT | Performed by: INTERNAL MEDICINE

## 2018-07-24 PROCEDURE — 85025 COMPLETE CBC W/AUTO DIFF WBC: CPT | Performed by: INTERNAL MEDICINE

## 2018-07-24 PROCEDURE — 83540 ASSAY OF IRON: CPT | Performed by: INTERNAL MEDICINE

## 2018-07-24 PROCEDURE — 80053 COMPREHEN METABOLIC PANEL: CPT | Performed by: INTERNAL MEDICINE

## 2018-07-24 PROCEDURE — 99213 OFFICE O/P EST LOW 20 MIN: CPT | Performed by: INTERNAL MEDICINE

## 2018-07-24 PROCEDURE — 82746 ASSAY OF FOLIC ACID SERUM: CPT | Performed by: INTERNAL MEDICINE

## 2018-07-24 PROCEDURE — 36415 COLL VENOUS BLD VENIPUNCTURE: CPT

## 2018-07-24 PROCEDURE — 82607 VITAMIN B-12: CPT | Performed by: INTERNAL MEDICINE

## 2018-07-24 PROCEDURE — 71046 X-RAY EXAM CHEST 2 VIEWS: CPT

## 2018-07-24 NOTE — TELEPHONE ENCOUNTER
----- Message from Ena Kelsey MD sent at 7/24/2018  2:41 PM CDT -----  Please call the patient regarding his abnormal result. Need venofer x8. tnx.

## 2018-07-24 NOTE — TELEPHONE ENCOUNTER
Attempted to call  concerning his iron saturation being low and his needing iron infusions.  Patient not available.

## 2018-07-24 NOTE — PROGRESS NOTES
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  Status post major surgeries, including bone graft from his right lower leg to his jaw, chemotherapy and radiation.  Continue to follow-up at [facility].   he has follow-up August 9.  He is due for follow-up CT scan which he will obtain in [place]  His pain is relatively well-controlled on Narco.  He is here today for refill of his normal call.  He is on puréed diet per oral, only uses his PEG tube for medication.  He denies fever chills, denies nausea or vomiting, denies headache.  He has maintained his weight.    Past Medical History:   Past Medical History:   Diagnosis Date   • Abdominal fistula    • Dysphonia    • Encounter for antineoplastic chemotherapy    • Encounter for long-term (current) use of medications    • Foot drop    • Iron deficiency anemia, unspecified    • Squamous cell cancer of tongue (CMS/HCC)      Past Surgical History:   Past Surgical History:   Procedure Laterality Date   • CAROTID STENT     • EXPLORATORY LAPAROTOMY N/A 5/11/2018    Procedure: EXPLORATORY LAPAROTOMY , TAKEDOWN GASTROCUTANEOUS  FISTULA;  Surgeon: Nancie Silveira MD;  Location: [facility] OR;  Service: General   • MOUTH SURGERY     • PORTACATH PLACEMENT     • TONGUE BIOPSY / EXCISION       Social History:   Social History     Social History   • Marital status: Single     Spouse name: N/A   • Number of children: N/A   • Years of education: N/A     Occupational History   • Not on file.     Social History Main Topics   • Smoking status: Former Smoker     Types: Cigarettes   • Smokeless tobacco: Never Used      Comment: quit nov 2013   • Alcohol use No   • Drug use: No   • Sexual activity: Defer     Other Topics Concern   • Not on file     Social  "History Narrative   • No narrative on file     Family History:   Family History   Problem Relation Age of Onset   • No Known Problems Mother    • Heart failure Father    • No Known Problems Daughter    • No Known Problems Son    • No Known Problems Daughter        Review of Systems   Constitutional: Negative.    HENT: Negative for congestion, dental problem, drooling, ear discharge, ear pain, facial swelling, hearing loss, mouth sores, sinus pressure, sore throat, tinnitus, trouble swallowing and voice change.    Eyes: Negative for photophobia, pain, discharge, redness, itching and visual disturbance.   Respiratory: Negative for cough, choking, chest tightness, shortness of breath, wheezing and stridor.    Cardiovascular: Negative.    Gastrointestinal: Negative for abdominal distention, abdominal pain, constipation, diarrhea, nausea and vomiting.   Endocrine: Negative for polyuria.   Genitourinary: Negative for flank pain and hematuria.   Musculoskeletal: Negative.    Skin: Negative for color change.   Neurological: Negative for dizziness, tremors, seizures, syncope, facial asymmetry, speech difficulty, weakness, light-headedness, numbness and headaches.   Hematological: Negative for adenopathy. Does not bruise/bleed easily.   Psychiatric/Behavioral: Negative for behavioral problems and confusion.        Medications:  The current medication list was reviewed in the EMR    ALLERGIES:    Allergies   Allergen Reactions   • Codeine Swelling     Swelling of throat       Objective      Vitals:    07/24/18 1300   BP: 130/74   Pulse: 71   Resp: 18   Temp: 97.4 °F (36.3 °C)   TempSrc: Tympanic   SpO2: 90%   Weight: 62.1 kg (137 lb)   Height: 179 cm (70.47\")       Current Status 7/24/2018   ECOG score 2         General Appearance:   Cachectic-appearing, Alert, cooperative, no distress, appears stated age   Head:    Normocephalic, without obvious abnormality, atraumatic   Eyes:    PERRL, conjunctiva/corneas clear, EOM's " intact, fundi     benign, both eyes   Ears:    Normal TM's and external ear canals, both ears   Nose:   Nares normal, septum midline, mucosa normal, no drainage     or sinus tenderness   Throat:   Lips, mucosa, and tongue normal; teeth and gums normal   Neck:   Surgical scar well-healed, radiation changes, scab on the left upper jaw. no adenopathy;     thyroid:  no enlargement/tenderness/nodules; no carotid    bruit or JVD   Back:     Symmetric, no curvature, ROM normal, no CVA tenderness   Lungs:     Clear to auscultation bilaterally, respirations unlabored   Chest Wall:    No tenderness or deformity    Heart:    Regular rate and rhythm, S1 and S2 normal, no murmur, rub    or gallop   Abdomen:     Soft, non-tender, bowel sounds active all four quadrants,     no masses, no organomegaly.PEG tube intact    Extremities:   Extremities normal, atraumatic, no cyanosis or edema   Pulses:   2+ and symmetric all extremities   Skin:   Skin color, texture, turgor normal, no rashes or lesions   Lymph nodes:   Cervical, supraclavicular, and axillary nodes normal   Neurologic:   CNII-XII intact, normal strength, sensation and reflexes     throughout       RECENT LABS:  Lab Results (last 24 hours)     Procedure Component Value Units Date/Time    CBC & Differential [[id number]] Collected:  07/24/18 1214    Specimen:  Blood Updated:  07/24/18 1221    Narrative:       The following orders were created for panel order CBC & Differential.  Procedure                               Abnormality         Status                     ---------                               -----------         ------                     CBC Auto Differential[[id number]]        Abnormal            Final result                 Please view results for these tests on the individual orders.    Comprehensive Metabolic Panel [[id number]]  (Abnormal) Collected:  07/24/18 1214    Specimen:  Blood Updated:  07/24/18 1231     Glucose 97 mg/dL      BUN 18 mg/dL      Creatinine  0.85 mg/dL      Sodium 141 mmol/L      Potassium 4.0 mmol/L      Chloride 101 mmol/L      CO2 30.0 mmol/L      Calcium 9.3 mg/dL      Total Protein 6.9 g/dL      Albumin 3.90 g/dL      ALT (SGPT) 23 U/L      AST (SGOT) 82 (H) U/L      Alkaline Phosphatase 75 U/L      Total Bilirubin 0.3 mg/dL      eGFR Non African Amer 92 mL/min/1.73      Globulin 3.0 gm/dL      A/G Ratio 1.3 g/dL      BUN/Creatinine Ratio 21.2     Anion Gap 10.0 mmol/L     CBC Auto Differential [[id number]]  (Abnormal) Collected:  07/24/18 1214    Specimen:  Blood Updated:  07/24/18 1221     WBC 8.62 10*3/mm3      RBC 3.27 (L) 10*6/mm3      Hemoglobin 9.0 (L) g/dL      Hematocrit 29.7 (L) %      MCV 90.8 fL      MCH 27.5 (L) pg      MCHC 30.3 (L) g/dL      RDW 16.2 (H) %      RDW-SD 54.6 (H) fl      MPV 9.1 fL      Platelets 475 (H) 10*3/mm3      Neutrophil % 87.3 (H) %      Lymphocyte % 5.3 (L) %      Monocyte % 4.9 %      Eosinophil % 1.9 %      Basophil % 0.1 %      Immature Grans % 0.5 %      Neutrophils, Absolute 7.53 10*3/mm3      Lymphocytes, Absolute 0.46 (L) 10*3/mm3      Monocytes, Absolute 0.42 10*3/mm3      Eosinophils, Absolute 0.16 10*3/mm3      Basophils, Absolute 0.01 10*3/mm3      Immature Grans, Absolute 0.04 (H) 10*3/mm3      nRBC 0.0 /100 WBC           RADIOLOGY:  Xr Chest Pa & Lateral    Result Date: 7/24/2018  Narrative: EXAMINATION: XR CHEST PA AND LATERAL-  7/24/2018 11:40 AM CDT  HISTORY: RESTAGING; C76.0-Malignant neoplasm of head, face and neck.  2 view chest x-ray compared to 08/21/2015.  Normal heart size. Moderate aortic arch calcification. Left subclavian port has normal appearance and is unchanged.  Prominently hyperexpanded lungs. Chronic interstitial disease with scattered granulomas.  There is no infiltrate or effusion.  Summary: 1. Stable chronic lung changes. 2. No acute disease. This report was finalized on 07/24/2018 12:08 by Dr. Neto Cheung MD.        VTE prophylaxis reviewed and addressed    Assessment/Plan   57-year-old male diagnosed with moderately differentiated invasive squamous cell carcinoma of the oral cavity stage IV a status post major surgeries including jaw transplant from a bone in his right leg, status post chemotherapy and radiation, currently CLARK.  Continue follow-up at the [facility] in [place]. CT due at that visit .  We will  see him in 3 months unless things change.  Jaw pain: Continue Narco refilled today.    2 -Anemia: check iron profile, b12, folate    3. CAD: on plavix    4. Pain from cancer: on norco           Obiageli Dominick Kelsey MD    7/24/2018    1:10 PM    Physical Exam

## 2018-07-25 ENCOUNTER — TELEPHONE (OUTPATIENT)
Dept: ONCOLOGY | Facility: CLINIC | Age: 58
End: 2018-07-25

## 2018-07-25 NOTE — TELEPHONE ENCOUNTER
Left message for  to call the office so that we can go over his labs and get him scheduled for iron infusions.

## 2018-08-01 ENCOUNTER — TELEPHONE (OUTPATIENT)
Dept: ONCOLOGY | Facility: CLINIC | Age: 58
End: 2018-08-01

## 2018-08-01 DIAGNOSIS — K90.9 INTESTINAL MALABSORPTION, UNSPECIFIED TYPE: ICD-10-CM

## 2018-08-01 DIAGNOSIS — D50.9 IRON DEFICIENCY ANEMIA, UNSPECIFIED IRON DEFICIENCY ANEMIA TYPE: ICD-10-CM

## 2018-08-01 RX ORDER — MEPERIDINE HYDROCHLORIDE 50 MG/ML
25 INJECTION INTRAMUSCULAR; INTRAVENOUS; SUBCUTANEOUS
Status: CANCELLED | OUTPATIENT
Start: 2018-08-15 | End: 2018-08-16

## 2018-08-01 RX ORDER — FAMOTIDINE 10 MG/ML
20 INJECTION, SOLUTION INTRAVENOUS AS NEEDED
Status: CANCELLED | OUTPATIENT
Start: 2018-08-15

## 2018-08-01 RX ORDER — DIPHENHYDRAMINE HYDROCHLORIDE 50 MG/ML
50 INJECTION INTRAMUSCULAR; INTRAVENOUS AS NEEDED
Status: CANCELLED | OUTPATIENT
Start: 2018-08-15

## 2018-08-01 RX ORDER — SODIUM CHLORIDE 9 MG/ML
250 INJECTION, SOLUTION INTRAVENOUS ONCE
Status: CANCELLED | OUTPATIENT
Start: 2018-08-15

## 2018-08-01 RX ORDER — SODIUM CHLORIDE 9 MG/ML
250 INJECTION, SOLUTION INTRAVENOUS ONCE
Status: CANCELLED | OUTPATIENT
Start: 2018-08-08

## 2018-08-01 RX ORDER — DIPHENHYDRAMINE HYDROCHLORIDE 50 MG/ML
50 INJECTION INTRAMUSCULAR; INTRAVENOUS AS NEEDED
Status: CANCELLED | OUTPATIENT
Start: 2018-08-08

## 2018-08-01 RX ORDER — FAMOTIDINE 10 MG/ML
20 INJECTION, SOLUTION INTRAVENOUS AS NEEDED
Status: CANCELLED | OUTPATIENT
Start: 2018-08-08

## 2018-08-01 RX ORDER — MEPERIDINE HYDROCHLORIDE 50 MG/ML
25 INJECTION INTRAMUSCULAR; INTRAVENOUS; SUBCUTANEOUS
Status: CANCELLED | OUTPATIENT
Start: 2018-08-08 | End: 2018-08-09

## 2018-08-01 NOTE — TELEPHONE ENCOUNTER
Reviewed iron studies with  and that Dr. Kelsey wants him to get iron infusions.  Scheduled to start on 8/8/18 at 0900.  Discussed with him that it will be 2 weeks in a row.  Verbalized understanding.

## 2018-08-08 ENCOUNTER — INFUSION (OUTPATIENT)
Dept: ONCOLOGY | Facility: HOSPITAL | Age: 58
End: 2018-08-08
Attending: INTERNAL MEDICINE

## 2018-08-08 VITALS
OXYGEN SATURATION: 100 % | TEMPERATURE: 97.7 F | WEIGHT: 137.4 LBS | HEART RATE: 55 BPM | RESPIRATION RATE: 18 BRPM | SYSTOLIC BLOOD PRESSURE: 173 MMHG | HEIGHT: 70 IN | BODY MASS INDEX: 19.67 KG/M2 | DIASTOLIC BLOOD PRESSURE: 66 MMHG

## 2018-08-08 DIAGNOSIS — D50.9 IRON DEFICIENCY ANEMIA, UNSPECIFIED IRON DEFICIENCY ANEMIA TYPE: ICD-10-CM

## 2018-08-08 DIAGNOSIS — K90.9 INTESTINAL MALABSORPTION, UNSPECIFIED TYPE: Primary | ICD-10-CM

## 2018-08-08 RX ORDER — FERROUS SULFATE 325(65) MG
325 TABLET ORAL 2 TIMES DAILY WITH MEALS
Qty: 60 TABLET | Refills: 5 | Status: SHIPPED | OUTPATIENT
Start: 2018-08-08 | End: 2019-01-01

## 2018-08-08 RX ORDER — DIPHENHYDRAMINE HYDROCHLORIDE 50 MG/ML
50 INJECTION INTRAMUSCULAR; INTRAVENOUS AS NEEDED
Status: DISCONTINUED | OUTPATIENT
Start: 2018-08-08 | End: 2018-08-08 | Stop reason: HOSPADM

## 2018-08-08 RX ORDER — HYDROCODONE BITARTRATE AND ACETAMINOPHEN 10; 325 MG/1; MG/1
1 TABLET ORAL EVERY 6 HOURS PRN
Qty: 120 TABLET | Refills: 0 | Status: SHIPPED | OUTPATIENT
Start: 2018-08-08 | End: 2018-09-05

## 2018-08-08 RX ORDER — MEPERIDINE HYDROCHLORIDE 50 MG/ML
25 INJECTION INTRAMUSCULAR; INTRAVENOUS; SUBCUTANEOUS
Status: DISCONTINUED | OUTPATIENT
Start: 2018-08-08 | End: 2018-08-08 | Stop reason: HOSPADM

## 2018-08-08 RX ORDER — FAMOTIDINE 10 MG/ML
20 INJECTION, SOLUTION INTRAVENOUS AS NEEDED
Status: DISCONTINUED | OUTPATIENT
Start: 2018-08-08 | End: 2018-08-08 | Stop reason: HOSPADM

## 2018-08-08 RX ORDER — SODIUM CHLORIDE 9 MG/ML
250 INJECTION, SOLUTION INTRAVENOUS ONCE
Status: DISCONTINUED | OUTPATIENT
Start: 2018-08-08 | End: 2018-08-08 | Stop reason: HOSPADM

## 2018-08-08 NOTE — PROGRESS NOTES
"0905 Patient very agitated. Said he is here for iron infusion, explained how long it would take and we would need to start an IV or if he had a port we could use that. He said \"what about the shot  she said she was going to give me?\". Explained that the Injectafer was an iron infusion. \"Nope not staying for that, going to talk to her about this\". Told him I would let the office know he did not want the infusion. Patient left in stable condition but upset.Pharmacy aware-Micah MATIAS    0910 Linda Boykin RN with Dr. Kelsey aware patient upset and left. Linda said patient had already stopped by office changing IV to oral iron.-Micah MATIAS      "

## 2018-08-08 NOTE — PROGRESS NOTES
Patient refused to do iron infusions after finding out it will take an hour today.  Which had been discussed with him before iron infusion was scheduled.  Patient requests that he be given iron pills to take instead.

## 2018-08-15 ENCOUNTER — APPOINTMENT (OUTPATIENT)
Dept: ONCOLOGY | Facility: HOSPITAL | Age: 58
End: 2018-08-15
Attending: INTERNAL MEDICINE

## 2018-08-29 ENCOUNTER — OFFICE VISIT (OUTPATIENT)
Dept: FAMILY MEDICINE CLINIC | Age: 58
End: 2018-08-29
Payer: MEDICARE

## 2018-08-29 VITALS
RESPIRATION RATE: 16 BRPM | TEMPERATURE: 97.9 F | BODY MASS INDEX: 19.76 KG/M2 | DIASTOLIC BLOOD PRESSURE: 86 MMHG | WEIGHT: 138 LBS | SYSTOLIC BLOOD PRESSURE: 134 MMHG | HEART RATE: 65 BPM | HEIGHT: 70 IN | OXYGEN SATURATION: 98 %

## 2018-08-29 DIAGNOSIS — S61.412A LACERATION OF LEFT HAND WITHOUT FOREIGN BODY, INITIAL ENCOUNTER: Primary | ICD-10-CM

## 2018-08-29 DIAGNOSIS — Z23 NEED FOR PROPHYLACTIC VACCINATION WITH TETANUS-DIPHTHERIA (TD): ICD-10-CM

## 2018-08-29 PROCEDURE — G8420 CALC BMI NORM PARAMETERS: HCPCS | Performed by: FAMILY MEDICINE

## 2018-08-29 PROCEDURE — 90471 IMMUNIZATION ADMIN: CPT | Performed by: FAMILY MEDICINE

## 2018-08-29 PROCEDURE — 90714 TD VACC NO PRESV 7 YRS+ IM: CPT | Performed by: FAMILY MEDICINE

## 2018-08-29 PROCEDURE — 99213 OFFICE O/P EST LOW 20 MIN: CPT | Performed by: FAMILY MEDICINE

## 2018-08-29 PROCEDURE — 3017F COLORECTAL CA SCREEN DOC REV: CPT | Performed by: FAMILY MEDICINE

## 2018-08-29 PROCEDURE — 12001 RPR S/N/AX/GEN/TRNK 2.5CM/<: CPT | Performed by: FAMILY MEDICINE

## 2018-08-29 PROCEDURE — G8427 DOCREV CUR MEDS BY ELIG CLIN: HCPCS | Performed by: FAMILY MEDICINE

## 2018-08-29 PROCEDURE — 1036F TOBACCO NON-USER: CPT | Performed by: FAMILY MEDICINE

## 2018-08-29 PROCEDURE — G8598 ASA/ANTIPLAT THER USED: HCPCS | Performed by: FAMILY MEDICINE

## 2018-08-29 RX ORDER — FERROUS SULFATE 325(65) MG
325 TABLET ORAL
COMMUNITY
Start: 2018-08-08 | End: 2019-06-12

## 2018-08-29 ASSESSMENT — ENCOUNTER SYMPTOMS
COUGH: 0
DIARRHEA: 0
SHORTNESS OF BREATH: 0
VOMITING: 0
ABDOMINAL PAIN: 0
CONSTIPATION: 0
RHINORRHEA: 0
NAUSEA: 0

## 2018-08-29 NOTE — PROGRESS NOTES
meds, diet, and exercise plans as instructed. Patient agrees to follow up as instructed and sooner if needed. Patient agrees to go to ER if condition becomes emergent. Return if symptoms worsen or fail to improve, for next scheduled follow up with PCP.

## 2018-08-29 NOTE — PATIENT INSTRUCTIONS
vaccine, OR has a severe allergy to any part of this vaccine, should not get Td vaccine. Tell the person giving the vaccine about any severe allergies. · Talk to your doctor if you:  ¨ Had severe pain or swelling after any vaccine containing diphtheria or tetanus. ¨ Ever had a condition called Guillain Barré Syndrome (GBS). ¨ Aren't feeling well on the day the shot is scheduled. Risks of a vaccine reaction  With any medicine, including vaccines, there is a chance of side effects. These are usually mild and go away on their own. Serious reactions are also possible but are rare. Most people who get Td vaccine do not have any problems with it. Mild problems, following Td vaccine  (Did not interfere with activities)  · Pain where the shot was given (about 8 people in 10)  · Redness or swelling where the shot was given (about 1 person in 4)  · Mild fever (rare)  · Headache (about 1 person in 4)  · Tiredness (about 1 person in 4)  Moderate problems, following Td vaccine  (Interfered with activities, but did not require medical attention)  · Fever over 102°F (rare)  Severe problems, following Td vaccine  (Unable to perform usual activities; required medical attention)  · Swelling, severe pain, bleeding, and/or redness in the arm where the shot was given (rare)  Problems that could happen after any vaccine:  · People sometimes faint after a medical procedure, including vaccination. Sitting or lying down for about 15 minutes can help prevent fainting, and injuries caused by a fall. Tell your doctor if you feel dizzy or have vision changes or ringing in the ears. · Some people get severe pain in the shoulder and have difficulty moving the arm where a shot was given. This happens very rarely. · Any medication can cause a severe allergic reaction. Such reactions from a vaccine are very rare, estimated at fewer than 1 in a million doses, and would happen within a few minutes to a few hours after the vaccination.   As with

## 2018-09-05 ENCOUNTER — NURSE TRIAGE (OUTPATIENT)
Dept: CALL CENTER | Facility: HOSPITAL | Age: 58
End: 2018-09-05

## 2018-09-05 ENCOUNTER — HOSPITAL ENCOUNTER (EMERGENCY)
Facility: HOSPITAL | Age: 58
Discharge: HOME | End: 2018-09-06
Admitting: EMERGENCY MEDICINE

## 2018-09-05 ENCOUNTER — HOSPITAL ENCOUNTER (EMERGENCY)
Facility: HOSPITAL | Age: 58
Discharge: HOME | End: 2018-09-05
Admitting: EMERGENCY MEDICINE

## 2018-09-05 ENCOUNTER — APPOINTMENT (OUTPATIENT)
Dept: GENERAL RADIOLOGY | Facility: HOSPITAL | Age: 58
End: 2018-09-05

## 2018-09-05 VITALS
HEIGHT: 70 IN | TEMPERATURE: 97.8 F | BODY MASS INDEX: 25.48 KG/M2 | SYSTOLIC BLOOD PRESSURE: 153 MMHG | HEART RATE: 72 BPM | RESPIRATION RATE: 17 BRPM | WEIGHT: 178 LBS | DIASTOLIC BLOOD PRESSURE: 73 MMHG | OXYGEN SATURATION: 98 %

## 2018-09-05 DIAGNOSIS — S81.812A LACERATION OF LEFT LOWER EXTREMITY, INITIAL ENCOUNTER: Primary | ICD-10-CM

## 2018-09-05 DIAGNOSIS — T14.8XXA BLEEDING FROM WOUND: Primary | ICD-10-CM

## 2018-09-05 DIAGNOSIS — S81.812A LACERATION OF LEFT LOWER EXTREMITY, INITIAL ENCOUNTER: ICD-10-CM

## 2018-09-05 PROCEDURE — 99283 EMERGENCY DEPT VISIT LOW MDM: CPT

## 2018-09-05 PROCEDURE — 73552 X-RAY EXAM OF FEMUR 2/>: CPT

## 2018-09-05 PROCEDURE — 25010000002 HYDROMORPHONE PER 4 MG: Performed by: EMERGENCY MEDICINE

## 2018-09-05 PROCEDURE — 99284 EMERGENCY DEPT VISIT MOD MDM: CPT

## 2018-09-05 PROCEDURE — 96372 THER/PROPH/DIAG INJ SC/IM: CPT

## 2018-09-05 RX ORDER — ONDANSETRON 4 MG/1
4 TABLET, ORALLY DISINTEGRATING ORAL ONCE
Status: COMPLETED | OUTPATIENT
Start: 2018-09-05 | End: 2018-09-05

## 2018-09-05 RX ORDER — CEPHALEXIN 500 MG/1
500 CAPSULE ORAL 4 TIMES DAILY
Qty: 28 CAPSULE | Refills: 0 | Status: SHIPPED | OUTPATIENT
Start: 2018-09-05 | End: 2019-02-08

## 2018-09-05 RX ORDER — DIAZEPAM 5 MG/1
5 TABLET ORAL ONCE
Status: COMPLETED | OUTPATIENT
Start: 2018-09-05 | End: 2018-09-05

## 2018-09-05 RX ORDER — HYDROMORPHONE HYDROCHLORIDE 1 MG/ML
1 INJECTION, SOLUTION INTRAMUSCULAR; INTRAVENOUS; SUBCUTANEOUS ONCE
Status: COMPLETED | OUTPATIENT
Start: 2018-09-05 | End: 2018-09-05

## 2018-09-05 RX ORDER — HYDROCODONE BITARTRATE AND ACETAMINOPHEN 10; 325 MG/1; MG/1
1 TABLET ORAL EVERY 6 HOURS PRN
Qty: 12 TABLET | Refills: 0 | Status: SHIPPED | OUTPATIENT
Start: 2018-09-05 | End: 2018-09-07 | Stop reason: SDUPTHER

## 2018-09-05 RX ORDER — LIDOCAINE HYDROCHLORIDE 10 MG/ML
10 INJECTION, SOLUTION INFILTRATION; PERINEURAL ONCE
Status: COMPLETED | OUTPATIENT
Start: 2018-09-05 | End: 2018-09-05

## 2018-09-05 RX ADMIN — HYDROMORPHONE HYDROCHLORIDE 1 MG: 1 INJECTION, SOLUTION INTRAMUSCULAR; INTRAVENOUS; SUBCUTANEOUS at 11:53

## 2018-09-05 RX ADMIN — LIDOCAINE HYDROCHLORIDE 10 ML: 10 INJECTION, SOLUTION INFILTRATION; PERINEURAL at 13:27

## 2018-09-05 RX ADMIN — ONDANSETRON 4 MG: 4 TABLET, ORALLY DISINTEGRATING ORAL at 11:53

## 2018-09-05 RX ADMIN — DIAZEPAM 5 MG: 5 TABLET ORAL at 11:53

## 2018-09-05 NOTE — ED NOTES
Patient discharged with AVS. AVS reviewed and patient given RX x2. All questions answered at this time. Vitals stable at discharge.        Richie Mccormick RN  09/05/18 9054

## 2018-09-05 NOTE — ED NOTES
Laceration to pt's left leg irrigated, cleaned and sutured by Brandi OLMOS. Wound dressed with saline soaked guaze and coban as area still bleeding. Patient discharged with AVS. AVS reviewed and patient given RX x2. All questions answered at this time. Vitals stable at discharge.        Richie Mccormick RN  09/05/18 3328

## 2018-09-05 NOTE — ED PROVIDER NOTES
Subjective     Laceration     Patient is a 58-year-old male chief complaint of leg laceration.  He describes that he was using a saw when it kicked back accidentally impacting his left lower femur.  He was wearing blue jeans.  The saw did penetrate through the jeans and onto his leg.  He had immediate pain with a large laceration.  He is on Plavix.  He is up-to-date with tetanus vaccination.  He immediately came to the ER to be evaluated.  Bleeding is controlled with pressure dressing placed in the ER.  He denies any other injury.  He denies any loss of sensation or movement.    Review of Systems   All other systems reviewed and are negative.      Past Medical History:   Diagnosis Date   • Abdominal fistula    • Dysphonia    • Encounter for antineoplastic chemotherapy    • Encounter for long-term (current) use of medications    • Foot drop    • Iron deficiency anemia, unspecified    • Squamous cell cancer of tongue (CMS/HCC)        Allergies   Allergen Reactions   • Codeine Swelling     Swelling of throat       Past Surgical History:   Procedure Laterality Date   • CAROTID STENT     • EXPLORATORY LAPAROTOMY N/A 5/11/2018    Procedure: EXPLORATORY LAPAROTOMY , TAKEDOWN GASTROCUTANEOUS  FISTULA;  Surgeon: Nancie Silveira MD;  Location: [facility] OR;  Service: General   • MOUTH SURGERY     • PORTACATH PLACEMENT     • TONGUE BIOPSY / EXCISION         Family History   Problem Relation Age of Onset   • No Known Problems Mother    • Heart failure Father    • No Known Problems Daughter    • No Known Problems Son    • No Known Problems Daughter        Social History     Social History   • Marital status: Single     Social History Main Topics   • Smoking status: Former Smoker     Types: Cigarettes   • Smokeless tobacco: Never Used      Comment: quit nov 2013   • Alcohol use No   • Drug use: No   • Sexual activity: Defer     Other Topics Concern   • Not on file       Prior to Admission medications    Medication Sig Start Date End  "Date Taking? Authorizing Provider   clopidogrel (PLAVIX) 75 MG tablet Take 75 mg by mouth Daily.    Provider, MD Jagdeep   ferrous sulfate 325 (65 FE) MG tablet Take 1 tablet by mouth 2 (Two) Times a Day With Meals. 8/8/18   Ena Kelsey MD   HYDROcodone-acetaminophen (NORCO)  MG per tablet Take 1 tablet by mouth Every 6 (Six) Hours As Needed for Moderate Pain . 8/8/18   Ena Kelsey MD       Medications   lidocaine (XYLOCAINE) 1 % injection 10 mL (not administered)   diazePAM (VALIUM) tablet 5 mg (5 mg Oral Given 9/5/18 1153)   HYDROmorphone (DILAUDID) injection 1 mg (1 mg Intramuscular Given 9/5/18 1153)   ondansetron ODT (ZOFRAN-ODT) disintegrating tablet 4 mg (4 mg Oral Given 9/5/18 1153)       /84   Pulse 74   Temp 97.8 °F (36.6 °C)   Resp 18   Ht 177.8 cm (70\")   Wt 80.7 kg (178 lb)   SpO2 98%   BMI 25.54 kg/m²       Objective   Physical Exam   Constitutional: He is oriented to person, place, and time. He appears well-developed and well-nourished.  Non-toxic appearance. No distress.   HENT:   Head: Atraumatic.   Right Ear: External ear normal.   Left Ear: External ear normal.   Nose: Nose normal.   Mouth/Throat: Uvula is midline, oropharynx is clear and moist and mucous membranes are normal.   Previous left lower jaw surgery   Eyes: Pupils are equal, round, and reactive to light. Conjunctivae, EOM and lids are normal. Lids are everted and swept, no foreign bodies found.   Neck: Trachea normal, normal range of motion, full passive range of motion without pain and phonation normal. Neck supple. Normal carotid pulses and no JVD present. Carotid bruit is not present. No neck rigidity.   Cardiovascular: Normal rate, regular rhythm, normal heart sounds, intact distal pulses and normal pulses.    Pulmonary/Chest: Effort normal and breath sounds normal. No stridor. No apnea and no tachypnea. No respiratory distress.   Abdominal: Normal appearance and normal aorta. "   Musculoskeletal: Normal range of motion. He exhibits tenderness.   Large laceration distal medial femur with bleeding controlled    Neurological: He is alert and oriented to person, place, and time. He has normal strength. No cranial nerve deficit or sensory deficit. Gait normal. GCS eye subscore is 4. GCS verbal subscore is 5. GCS motor subscore is 6.   Skin: Skin is warm, dry and intact. Capillary refill takes less than 2 seconds. He is not diaphoretic. No pallor.   Psychiatric: He has a normal mood and affect. His speech is normal and behavior is normal.   Nursing note and vitals reviewed.      Laceration Repair  Date/Time: 9/5/2018 12:43 PM  Performed by: PLACIDO TUCKER  Authorized by: PLACIDO TUCKER     Consent:     Consent obtained:  Verbal    Consent given by:  Patient    Risks discussed:  Infection, need for additional repair, nerve damage, pain, poor cosmetic result, poor wound healing and retained foreign body  Anesthesia (see MAR for exact dosages):     Anesthesia method:  Local infiltration    Local anesthetic:  Lidocaine 1% w/o epi  Laceration details:     Location:  Leg    Leg location:  L upper leg    Length (cm):  10    Depth (mm):  15  Pre-procedure details:     Preparation:  Patient was prepped and draped in usual sterile fashion and imaging obtained to evaluate for foreign bodies  Exploration:     Hemostasis achieved with:  Direct pressure    Wound exploration: wound explored through full range of motion and entire depth of wound probed and visualized    Treatment:     Area cleansed with:  Saline and Shur-Clens    Amount of cleaning:  Extensive    Irrigation solution:  Sterile saline    Irrigation volume:  2L    Irrigation method:  Pressure wash    Visualized foreign bodies/material removed: no    Skin repair:     Repair method:  Sutures    Suture size:  4-0    Suture material:  Nylon    Suture technique:  Simple interrupted    Number of sutures:  14  Approximation:      Approximation:  Close  Post-procedure details:     Dressing:  Non-adherent dressing, sterile dressing and bulky dressing (pressure dressing)    Patient tolerance of procedure:  Tolerated well, no immediate complications               Lab Results (last 24 hours)     ** No results found for the last 24 hours. **          Xr Femur 2 View Left    Result Date: 9/5/2018  Narrative: LEFT FEMUR, 2 VIEWS 9/5/2018 12:06 PM CDT  HISTORY: saw vs leg  COMPARISON: NONE  FINDINGS:  Frontal and lateral radiographs of the left femur were obtained.  There is no evidence of fracture or worrisome osseous lesion. The soft tissues are grossly unremarkable. Limited evaluation of the hip and knee joints is unremarkable.      Impression: 1. Unremarkable radiographs of the left femur. This report was finalized on 09/05/2018 12:20 by Dr. Isaac Franz MD.      ED Course        Patient tolerated laceration repair without any issues.  He and his mother have been educated on wound care.  He will be discharged stable condition.  MDM    Final diagnoses:   Laceration of left lower extremity, initial encounter          Susie Renteria PA  09/05/18 0140

## 2018-09-05 NOTE — DISCHARGE INSTRUCTIONS
Laceration Care, Adult  A laceration is a cut that goes through all of the layers of the skin and into the tissue that is right under the skin. Some lacerations heal on their own. Others need to be closed with stitches (sutures), staples, skin adhesive strips, or skin glue. Proper laceration care minimizes the risk of infection and helps the laceration to heal better.  How is this treated?  If sutures or staples were used:  · Keep the wound clean and dry.  · If you were given a bandage (dressing), you should change it at least one time per day or as told by your health care provider. You should also change it if it becomes wet or dirty.  · Keep the wound completely dry for the first 24 hours or as told by your health care provider. After that time, you may shower or bathe. However, make sure that the wound is not soaked in water until after the sutures or staples have been removed.  · Clean the wound one time each day or as told by your health care provider:  ? Wash the wound with soap and water.  ? Rinse the wound with water to remove all soap.  ? Pat the wound dry with a clean towel. Do not rub the wound.  · After cleaning the wound, apply a thin layer of antibiotic ointment as told by your health care provider. This will help to prevent infection and keep the dressing from sticking to the wound.  · Have the sutures or staples removed as told by your health care provider.  If skin adhesive strips were used:  · Keep the wound clean and dry.  · If you were given a bandage (dressing), you should change it at least one time per day or as told by your health care provider. You should also change it if it becomes dirty or wet.  · Do not get the skin adhesive strips wet. You may shower or bathe, but be careful to keep the wound dry.  · If the wound gets wet, pat it dry with a clean towel. Do not rub the wound.  · Skin adhesive strips fall off on their own. You may trim the strips as the wound heals. Do not remove skin  adhesive strips that are still stuck to the wound. They will fall off in time.  If skin glue was used:  · Try to keep the wound dry, but you may briefly wet it in the shower or bath. Do not soak the wound in water, such as by swimming.  · After you have showered or bathed, gently pat the wound dry with a clean towel. Do not rub the wound.  · Do not do any activities that will make you sweat heavily until the skin glue has fallen off on its own.  · Do not apply liquid, cream, or ointment medicine to the wound while the skin glue is in place. Using those may loosen the film before the wound has healed.  · If you were given a bandage (dressing), you should change it at least one time per day or as told by your health care provider. You should also change it if it becomes dirty or wet.  · If a dressing is placed over the wound, be careful not to apply tape directly over the skin glue. Doing that may cause the glue to be pulled off before the wound has healed.  · Do not pick at the glue. The skin glue usually remains in place for 5-10 days, then it falls off of the skin.  General Instructions  · Take over-the-counter and prescription medicines only as told by your health care provider.  · If you were prescribed an antibiotic medicine or ointment, take or apply it as told by your doctor. Do not stop using it even if your condition improves.  · To help prevent scarring, make sure to cover your wound with sunscreen whenever you are outside after stitches are removed, after adhesive strips are removed, or when glue remains in place and the wound is healed. Make sure to wear a sunscreen of at least 30 SPF.  · Do not scratch or pick at the wound.  · Keep all follow-up visits as told by your health care provider. This is important.  · Check your wound every day for signs of infection. Watch for:  ? Redness, swelling, or pain.  ? Fluid, blood, or pus.  · Raise (elevate) the injured area above the level of your heart while you  are sitting or lying down, if possible.  Contact a health care provider if:  · You received a tetanus shot and you have swelling, severe pain, redness, or bleeding at the injection site.  · You have a fever.  · A wound that was closed breaks open.  · You notice a bad smell coming from your wound or your dressing.  · You notice something coming out of the wound, such as wood or glass.  · Your pain is not controlled with medicine.  · You have increased redness, swelling, or pain at the site of your wound.  · You have fluid, blood, or pus coming from your wound.  · You notice a change in the color of your skin near your wound.  · You need to change the dressing frequently due to fluid, blood, or pus draining from the wound.  · You develop a new rash.  · You develop numbness around the wound.  Get help right away if:  · You develop severe swelling around the wound.  · Your pain suddenly increases and is severe.  · You develop painful lumps near the wound or on skin that is anywhere on your body.  · You have a red streak going away from your wound.  · The wound is on your hand or foot and you cannot properly move a finger or toe.  · The wound is on your hand or foot and you notice that your fingers or toes look pale or bluish.  This information is not intended to replace advice given to you by your health care provider. Make sure you discuss any questions you have with your health care provider.  Document Released: 12/18/2006 Document Revised: 05/19/2017 Document Reviewed: 12/14/2015  [employer] Interactive Patient Education © 2018 [employer]

## 2018-09-06 VITALS
WEIGHT: 178 LBS | OXYGEN SATURATION: 100 % | HEART RATE: 67 BPM | TEMPERATURE: 97.8 F | BODY MASS INDEX: 25.48 KG/M2 | RESPIRATION RATE: 20 BRPM | DIASTOLIC BLOOD PRESSURE: 77 MMHG | HEIGHT: 70 IN | SYSTOLIC BLOOD PRESSURE: 144 MMHG

## 2018-09-06 RX ADMIN — THROMBIN, TOPICAL (BOVINE) 5000 UNITS: KIT at 00:29

## 2018-09-06 RX ADMIN — SILVER NITRATE APPLICATORS 1 APPLICATION: 25; 75 STICK TOPICAL at 00:20

## 2018-09-06 NOTE — ED PROVIDER NOTES
Subjective   Patient is a 58-year-old white male presents with bleeding from his laceration to his left lower extremity.  Patient was seen here about noon today after cutting his leg with a skill saw.  Patient did have a pressure dressing applied before being discharged from the emergency department.  Patient is on Plavix and states that he has changed the dressing 3 times since she has been home due to bleeding.  He states is been keeping the extremity elevated.  He denies any new injury since leaving the emergency department.        History provided by:  Patient   used: No        Review of Systems   Constitutional: Negative.    HENT: Negative.    Eyes: Negative.    Respiratory: Negative.    Cardiovascular: Negative.    Gastrointestinal: Negative.    Endocrine: Negative.    Genitourinary: Negative.    Musculoskeletal: Negative.    Skin:        Patient is a 58-year-old white male presents with bleeding from his laceration to his left lower extremity.  Patient was seen here about noon today after cutting his leg with a skill saw.  Patient did have a pressure dressing applied before being discharged from the emergency department.  Patient is on Plavix and states that he has changed the dressing 3 times since she has been home due to bleeding.  He states is been keeping the extremity elevated.  He denies any new injury since leaving the emergency department.     Allergic/Immunologic: Negative.    Neurological: Negative.    Hematological: Negative.    Psychiatric/Behavioral: Negative.    All other systems reviewed and are negative.      Past Medical History:   Diagnosis Date   • Abdominal fistula    • Dysphonia    • Encounter for antineoplastic chemotherapy    • Encounter for long-term (current) use of medications    • Foot drop    • Iron deficiency anemia, unspecified    • Squamous cell cancer of tongue (CMS/HCC)        Allergies   Allergen Reactions   • Codeine Swelling     Swelling of throat  "      Past Surgical History:   Procedure Laterality Date   • CAROTID STENT     • EXPLORATORY LAPAROTOMY N/A 5/11/2018    Procedure: EXPLORATORY LAPAROTOMY , TAKEDOWN GASTROCUTANEOUS  FISTULA;  Surgeon: Nancie Silveira MD;  Location: [facility] OR;  Service: General   • MOUTH SURGERY     • PORTACATH PLACEMENT     • TONGUE BIOPSY / EXCISION         Family History   Problem Relation Age of Onset   • No Known Problems Mother    • Heart failure Father    • No Known Problems Daughter    • No Known Problems Son    • No Known Problems Daughter        Social History     Social History   • Marital status: Single     Social History Main Topics   • Smoking status: Former Smoker     Types: Cigarettes   • Smokeless tobacco: Never Used      Comment: quit nov 2013   • Alcohol use No   • Drug use: No   • Sexual activity: Defer     Other Topics Concern   • Not on file       Prior to Admission medications    Medication Sig Start Date End Date Taking? Authorizing Provider   cephalexin (KEFLEX) 500 MG capsule Take 1 capsule by mouth 4 (Four) Times a Day. 9/5/18   Susie Renteria PA   clopidogrel (PLAVIX) 75 MG tablet Take 75 mg by mouth Daily.    Provider, MD Jagdeep   ferrous sulfate 325 (65 FE) MG tablet Take 1 tablet by mouth 2 (Two) Times a Day With Meals. 8/8/18   Ena Kelsey MD   HYDROcodone-acetaminophen (NORCO)  MG per tablet Take 1 tablet by mouth Every 6 (Six) Hours As Needed for Moderate Pain . 9/5/18   Ira Ramos MD       /77 (BP Location: Right arm, Patient Position: Lying)   Pulse 67   Temp 97.8 °F (36.6 °C) (Oral)   Resp 20   Ht 177.8 cm (70\")   Wt 80.7 kg (178 lb)   SpO2 100%   BMI 25.54 kg/m²     Objective   Physical Exam   Constitutional: He is oriented to person, place, and time. He appears well-developed and well-nourished.   HENT:   Head: Normocephalic and atraumatic.   Eyes: Pupils are equal, round, and reactive to light. Conjunctivae and EOM are normal. "   Cardiovascular: Normal rate, regular rhythm, normal heart sounds and intact distal pulses.    Pulmonary/Chest: Effort normal and breath sounds normal.   Neurological: He is alert and oriented to person, place, and time. He has normal reflexes.   Skin: Skin is warm and dry.   LLE: laceration to left thigh well approx. Active bleeding from distal end of wound. Trickling. Pedal pulses palp.    Psychiatric: He has a normal mood and affect. His behavior is normal. Judgment and thought content normal.   Nursing note and vitals reviewed.      Procedures         Lab Results (last 24 hours)     ** No results found for the last 24 hours. **          No orders to display       ED Course  ED Course as of Sep 06 0149   Thu Sep 06, 2018   0038 Attempted to control bleeding with silver nitrate which was partially successful.  Sprayed thrombin spray 5000 units and applied Surgicel to the wound.  Applied a pressure dressing with 4 x 4's, Kerlix, and coban. If bleeding stays controlled, will discharge pt home shortly in stable condition . Advised pt to keep extremity elevated and apply ice to area as well   [CW]      ED Course User Index  [CW] Paty Solis APRN          MDM  Number of Diagnoses or Management Options  Bleeding from wound: minor  Laceration of left lower extremity, initial encounter: minor  Patient Progress  Patient progress: stable      Final diagnoses:   Bleeding from wound   Laceration of left lower extremity, initial encounter          Paty Solis APRN  09/06/18 0149

## 2018-09-06 NOTE — TELEPHONE ENCOUNTER
"Mother called states pt is having bleeding of leg from laceration was seen in ER earlier today. Bleeding even with pressure and elevation soaking dressing's. What should we do?     Reason for Disposition  • [1] Bleeding AND [2] won't stop after 10 minutes of direct pressure (using correct technique)    Additional Information  • Negative: [1] Major bleeding (e.g., actively dripping or spurting) AND [2] can't be stopped  • Negative: Amputation  • Negative: Shock suspected (e.g., cold/pale/clammy skin, too weak to stand, low BP, rapid pulse)  • Negative: [1] Knife wound (or other possibly deep cut) AND [2] to chest, abdomen, back, neck, or head  • Negative: [1] Cutter (self-mutilator) AND [2] suicidal or out-of-control  • Negative: Sounds like a life-threatening emergency to the triager  • Negative: [1] Animal bite AND [2] broken skin  • Negative: [1] Human bite AND [2] broken skin    Answer Assessment - Initial Assessment Questions  1. APPEARANCE of INJURY: \"What does the injury look like?\"       Cut from saw bleeding  2. SIZE: \"How large is the cut?\"       unknown  3. BLEEDING: \"Is it bleeding now?\" If so, ask: \"Is it difficult to stop?\"       Yes, holding pressure  4. LOCATION: \"Where is the injury located?\"       leg  5. ONSET: \"How long ago did the injury occur?\"       today  6. MECHANISM: \"Tell me how it happened.\"       Cut using saw  7. TETANUS: \"When was the last tetanus booster?\"      unknown  8. PREGNANCY: \"Is there any chance you are pregnant?\" \"When was your last menstrual period?\"      n/a    Protocols used: CUTS AND LACERATIONS-ADULT-AH      "

## 2018-09-06 NOTE — DISCHARGE INSTRUCTIONS
Return to ER if symptoms worsen   Keep dressing on tonight  Keep leg elevated with ice to area    Laceration Care, Adult  A laceration is a cut that goes through all of the layers of the skin and into the tissue that is right under the skin. Some lacerations heal on their own. Others need to be closed with stitches (sutures), staples, skin adhesive strips, or skin glue. Proper laceration care minimizes the risk of infection and helps the laceration to heal better.  How is this treated?  If sutures or staples were used:  · Keep the wound clean and dry.  · If you were given a bandage (dressing), you should change it at least one time per day or as told by your health care provider. You should also change it if it becomes wet or dirty.  · Keep the wound completely dry for the first 24 hours or as told by your health care provider. After that time, you may shower or bathe. However, make sure that the wound is not soaked in water until after the sutures or staples have been removed.  · Clean the wound one time each day or as told by your health care provider:  ? Wash the wound with soap and water.  ? Rinse the wound with water to remove all soap.  ? Pat the wound dry with a clean towel. Do not rub the wound.  · After cleaning the wound, apply a thin layer of antibiotic ointment as told by your health care provider. This will help to prevent infection and keep the dressing from sticking to the wound.  · Have the sutures or staples removed as told by your health care provider.  If skin adhesive strips were used:  · Keep the wound clean and dry.  · If you were given a bandage (dressing), you should change it at least one time per day or as told by your health care provider. You should also change it if it becomes dirty or wet.  · Do not get the skin adhesive strips wet. You may shower or bathe, but be careful to keep the wound dry.  · If the wound gets wet, pat it dry with a clean towel. Do not rub the wound.  · Skin  adhesive strips fall off on their own. You may trim the strips as the wound heals. Do not remove skin adhesive strips that are still stuck to the wound. They will fall off in time.  If skin glue was used:  · Try to keep the wound dry, but you may briefly wet it in the shower or bath. Do not soak the wound in water, such as by swimming.  · After you have showered or bathed, gently pat the wound dry with a clean towel. Do not rub the wound.  · Do not do any activities that will make you sweat heavily until the skin glue has fallen off on its own.  · Do not apply liquid, cream, or ointment medicine to the wound while the skin glue is in place. Using those may loosen the film before the wound has healed.  · If you were given a bandage (dressing), you should change it at least one time per day or as told by your health care provider. You should also change it if it becomes dirty or wet.  · If a dressing is placed over the wound, be careful not to apply tape directly over the skin glue. Doing that may cause the glue to be pulled off before the wound has healed.  · Do not pick at the glue. The skin glue usually remains in place for 5-10 days, then it falls off of the skin.  General Instructions  · Take over-the-counter and prescription medicines only as told by your health care provider.  · If you were prescribed an antibiotic medicine or ointment, take or apply it as told by your doctor. Do not stop using it even if your condition improves.  · To help prevent scarring, make sure to cover your wound with sunscreen whenever you are outside after stitches are removed, after adhesive strips are removed, or when glue remains in place and the wound is healed. Make sure to wear a sunscreen of at least 30 SPF.  · Do not scratch or pick at the wound.  · Keep all follow-up visits as told by your health care provider. This is important.  · Check your wound every day for signs of infection. Watch for:  ? Redness, swelling, or  pain.  ? Fluid, blood, or pus.  · Raise (elevate) the injured area above the level of your heart while you are sitting or lying down, if possible.  Contact a health care provider if:  · You received a tetanus shot and you have swelling, severe pain, redness, or bleeding at the injection site.  · You have a fever.  · A wound that was closed breaks open.  · You notice a bad smell coming from your wound or your dressing.  · You notice something coming out of the wound, such as wood or glass.  · Your pain is not controlled with medicine.  · You have increased redness, swelling, or pain at the site of your wound.  · You have fluid, blood, or pus coming from your wound.  · You notice a change in the color of your skin near your wound.  · You need to change the dressing frequently due to fluid, blood, or pus draining from the wound.  · You develop a new rash.  · You develop numbness around the wound.  Get help right away if:  · You develop severe swelling around the wound.  · Your pain suddenly increases and is severe.  · You develop painful lumps near the wound or on skin that is anywhere on your body.  · You have a red streak going away from your wound.  · The wound is on your hand or foot and you cannot properly move a finger or toe.  · The wound is on your hand or foot and you notice that your fingers or toes look pale or bluish.  This information is not intended to replace advice given to you by your health care provider. Make sure you discuss any questions you have with your health care provider.  Document Released: 12/18/2006 Document Revised: 05/19/2017 Document Reviewed: 12/14/2015  [employer] Interactive Patient Education © 2018 [employer]

## 2018-09-07 RX ORDER — HYDROCODONE BITARTRATE AND ACETAMINOPHEN 10; 325 MG/1; MG/1
1 TABLET ORAL EVERY 6 HOURS PRN
Qty: 120 TABLET | Refills: 0 | Status: SHIPPED | OUTPATIENT
Start: 2018-09-07 | End: 2018-10-08 | Stop reason: SDUPTHER

## 2018-09-20 ENCOUNTER — HOSPITAL ENCOUNTER (EMERGENCY)
Facility: HOSPITAL | Age: 58
Discharge: HOME | End: 2018-09-20
Admitting: EMERGENCY MEDICINE

## 2018-09-20 VITALS
SYSTOLIC BLOOD PRESSURE: 151 MMHG | OXYGEN SATURATION: 95 % | DIASTOLIC BLOOD PRESSURE: 70 MMHG | HEART RATE: 65 BPM | HEIGHT: 70 IN | WEIGHT: 131.8 LBS | BODY MASS INDEX: 18.87 KG/M2 | RESPIRATION RATE: 14 BRPM | TEMPERATURE: 98.2 F

## 2018-09-20 DIAGNOSIS — Z48.02 VISIT FOR SUTURE REMOVAL: Primary | ICD-10-CM

## 2018-09-20 PROCEDURE — 99201: CPT

## 2018-09-20 NOTE — ED PROVIDER NOTES
Subjective   Patient is a 58-year-old white male presents to emergency department for suture removal from the left thigh.  Patient sustained a laceration on September 5 and was seen in the emergency department to have the laceration repaired.  Has had to make one extra visit to the emergency department for excessive bleeding on the night of the repair.  He states since then he has been doing fine.  No pain or drainage.        History provided by:  Patient   used: No        Review of Systems   Constitutional: Negative.    HENT: Negative.    Eyes: Negative.    Respiratory: Negative.    Cardiovascular: Negative.    Gastrointestinal: Negative.    Endocrine: Negative.    Genitourinary: Negative.    Musculoskeletal: Negative.    Skin:        Patient is a 58-year-old white male presents to emergency department for suture removal from the left thigh.  Patient sustained a laceration on September 5 and was seen in the emergency department to have the laceration repaired.  Has had to make one extra visit to the emergency department for excessive bleeding on the night of the repair.  He states since then he has been doing fine.  No pain or drainage.     Allergic/Immunologic: Negative.    Neurological: Negative.    Hematological: Negative.    Psychiatric/Behavioral: Negative.    All other systems reviewed and are negative.      Past Medical History:   Diagnosis Date   • Abdominal fistula    • Dysphonia    • Encounter for antineoplastic chemotherapy    • Encounter for long-term (current) use of medications    • Foot drop    • Iron deficiency anemia, unspecified    • Squamous cell cancer of tongue (CMS/HCC)        Allergies   Allergen Reactions   • Codeine Swelling     Swelling of throat       Past Surgical History:   Procedure Laterality Date   • CAROTID STENT     • EXPLORATORY LAPAROTOMY N/A 5/11/2018    Procedure: EXPLORATORY LAPAROTOMY , TAKEDOWN GASTROCUTANEOUS  FISTULA;  Surgeon: Nancie Silveira MD;   "Location: [facility] OR;  Service: General   • MOUTH SURGERY     • PORTACATH PLACEMENT     • TONGUE BIOPSY / EXCISION         Family History   Problem Relation Age of Onset   • No Known Problems Mother    • Heart failure Father    • No Known Problems Daughter    • No Known Problems Son    • No Known Problems Daughter        Social History     Social History   • Marital status: Single     Social History Main Topics   • Smoking status: Former Smoker     Types: Cigarettes   • Smokeless tobacco: Never Used      Comment: quit nov 2013   • Alcohol use No   • Drug use: No   • Sexual activity: Defer     Other Topics Concern   • Not on file       Prior to Admission medications    Medication Sig Start Date End Date Taking? Authorizing Provider   cephalexin (KEFLEX) 500 MG capsule Take 1 capsule by mouth 4 (Four) Times a Day. 9/5/18   Susie Renteria PA   clopidogrel (PLAVIX) 75 MG tablet Take 75 mg by mouth Daily.    Provider, MD Jagdeep   ferrous sulfate 325 (65 FE) MG tablet Take 1 tablet by mouth 2 (Two) Times a Day With Meals. 8/8/18   Ena Kelsey MD   HYDROcodone-acetaminophen (NORCO)  MG per tablet Take 1 tablet by mouth Every 6 (Six) Hours As Needed for Moderate Pain . 9/7/18   Ena Kelsey MD       /70 (Patient Position: Sitting)   Pulse 65   Temp 98.2 °F (36.8 °C) (Temporal Artery )   Resp 14   Ht 177.8 cm (70\")   Wt 59.8 kg (131 lb 12.8 oz)   SpO2 95%   BMI 18.91 kg/m²     Objective   Physical Exam   Constitutional: He is oriented to person, place, and time. He appears well-developed and well-nourished.   HENT:   Head: Normocephalic and atraumatic.   Eyes: Pupils are equal, round, and reactive to light. Conjunctivae and EOM are normal.   Neck: Normal range of motion. Neck supple.   Cardiovascular: Normal rate, regular rhythm, normal heart sounds and intact distal pulses.    Pulmonary/Chest: Effort normal and breath sounds normal.   Abdominal: Soft. Bowel " sounds are normal.   Musculoskeletal: Normal range of motion.   Neurological: He is alert and oriented to person, place, and time. He has normal reflexes.   Skin: Skin is warm and dry.   Lle: wound healed well. Edges well approx. No signs of infections   Psychiatric: He has a normal mood and affect. His behavior is normal. Judgment and thought content normal.   Nursing note and vitals reviewed.      Procedures         Lab Results (last 24 hours)     ** No results found for the last 24 hours. **          No orders to display       ED Course          MDM  Number of Diagnoses or Management Options  Visit for suture removal: minor  Patient Progress  Patient progress: stable      Final diagnoses:   Visit for suture removal          Paty Solis, APRN  09/20/18 9201

## 2018-09-20 NOTE — DISCHARGE INSTRUCTIONS
Return to ER if symptoms worsen   Leave steri-strips on until they come off    Incision Care, Adult  An incision is a cut that a doctor makes in your skin for surgery (for a procedure). Most times, these cuts are closed after surgery. Your cut from surgery may be closed with stitches (sutures), staples, skin glue, or skin tape (adhesive strips). You may need to return to your doctor to have stitches or staples taken out. This may happen many days or many weeks after your surgery. The cut needs to be well cared for so it does not get infected.  How to care for your cut  Cut care  · Follow instructions from your doctor about how to take care of your cut. Make sure you:  ? Wash your hands with soap and water before you change your bandage (dressing). If you cannot use soap and water, use hand .  ? Change your bandage as told by your doctor.  ? Leave stitches, skin glue, or skin tape in place. They may need to stay in place for 2 weeks or longer. If tape strips get loose and curl up, you may trim the loose edges. Do not remove tape strips completely unless your doctor says it is okay.  · Check your cut area every day for signs of infection. Check for:  ? More redness, swelling, or pain.  ? More fluid or blood.  ? Warmth.  ? Pus or a bad smell.  · Ask your doctor how to clean the cut. This may include:  ? Using mild soap and water.  ? Using a clean towel to pat the cut dry after you clean it.  ? Putting a cream or ointment on the cut. Do this only as told by your doctor.  ? Covering the cut with a clean bandage.  · Ask your doctor when you can leave the cut uncovered.  · Do not take baths, swim, or use a hot tub until your doctor says it is okay. Ask your doctor if you can take showers. You may only be allowed to take sponge baths for bathing.  Medicines  · If you were prescribed an antibiotic medicine, cream, or ointment, take the antibiotic or put it on the cut as told by your doctor. Do not stop taking or  putting on the antibiotic even if your condition gets better.  · Take over-the-counter and prescription medicines only as told by your doctor.  General instructions  · Limit movement around your cut. This helps healing.  ? Avoid straining, lifting, or exercise for the first month, or for as long as told by your doctor.  ? Follow instructions from your doctor about going back to your normal activities.  ? Ask your doctor what activities are safe.  · Protect your cut from the sun when you are outside for the first 6 months, or for as long as told by your doctor. Put on sunscreen around the scar or cover up the scar.  · Keep all follow-up visits as told by your doctor. This is important.  Contact a doctor if:  · Your have more redness, swelling, or pain around the cut.  · You have more fluid or blood coming from the cut.  · Your cut feels warm to the touch.  · You have pus or a bad smell coming from the cut.  · You have a fever or shaking chills.  · You feel sick to your stomach (nauseous) or you throw up (vomit).  · You are dizzy.  · Your stitches or staples come undone.  Get help right away if:  · You have a red streak coming from your cut.  · Your cut bleeds through the bandage and the bleeding does not stop with gentle pressure.  · The edges of your cut open up and separate.  · You have very bad (severe) pain.  · You have a rash.  · You are confused.  · You pass out (faint).  · You have trouble breathing and you have a fast heartbeat.  This information is not intended to replace advice given to you by your health care provider. Make sure you discuss any questions you have with your health care provider.  Document Released: 03/11/2013 Document Revised: 08/25/2017 Document Reviewed: 08/25/2017  Elsevier Interactive Patient Education © 2017 [employer]

## 2018-10-08 RX ORDER — HYDROCODONE BITARTRATE AND ACETAMINOPHEN 10; 325 MG/1; MG/1
1 TABLET ORAL EVERY 6 HOURS PRN
Qty: 120 TABLET | Refills: 0 | Status: SHIPPED | OUTPATIENT
Start: 2018-10-08 | End: 2018-11-08 | Stop reason: SDUPTHER

## 2018-10-08 NOTE — TELEPHONE ENCOUNTER
Received call from patient requesting refill of his pain medication Norco, discussed with Dr Kelsey, she gave verbal consent for refill/  Patient notified to come by office to  script, apt was padmini for f/u and eval per Dr Kelsey instructions for one month.

## 2018-11-08 ENCOUNTER — OFFICE VISIT (OUTPATIENT)
Dept: ONCOLOGY | Facility: CLINIC | Age: 58
End: 2018-11-08

## 2018-11-08 ENCOUNTER — APPOINTMENT (OUTPATIENT)
Dept: LAB | Facility: HOSPITAL | Age: 58
End: 2018-11-08

## 2018-11-08 VITALS
OXYGEN SATURATION: 90 % | HEART RATE: 64 BPM | TEMPERATURE: 97.5 F | DIASTOLIC BLOOD PRESSURE: 82 MMHG | SYSTOLIC BLOOD PRESSURE: 134 MMHG | BODY MASS INDEX: 19.01 KG/M2 | HEIGHT: 70 IN | RESPIRATION RATE: 18 BRPM | WEIGHT: 132.8 LBS

## 2018-11-08 DIAGNOSIS — C76.0 MALIGNANT NEOPLASM OF HEAD, FACE AND NECK (HCC): Primary | ICD-10-CM

## 2018-11-08 DIAGNOSIS — K90.9 INTESTINAL MALABSORPTION, UNSPECIFIED TYPE: ICD-10-CM

## 2018-11-08 DIAGNOSIS — C76.0 HEAD AND NECK CANCER (HCC): Primary | ICD-10-CM

## 2018-11-08 DIAGNOSIS — D50.8 OTHER IRON DEFICIENCY ANEMIA: ICD-10-CM

## 2018-11-08 DIAGNOSIS — D50.9 IRON DEFICIENCY ANEMIA, UNSPECIFIED IRON DEFICIENCY ANEMIA TYPE: ICD-10-CM

## 2018-11-08 LAB
ALBUMIN SERPL-MCNC: 4.4 G/DL (ref 3.5–5)
ALBUMIN/GLOB SERPL: 1.3 G/DL (ref 1.1–2.5)
ALP SERPL-CCNC: 83 U/L (ref 24–120)
ALT SERPL W P-5'-P-CCNC: 19 U/L (ref 0–54)
ANION GAP SERPL CALCULATED.3IONS-SCNC: 11 MMOL/L (ref 4–13)
AST SERPL-CCNC: 72 U/L (ref 7–45)
BASOPHILS # BLD AUTO: 0.05 10*3/MM3 (ref 0–0.2)
BASOPHILS NFR BLD AUTO: 0.7 % (ref 0–2)
BILIRUB SERPL-MCNC: 0.4 MG/DL (ref 0.1–1)
BUN BLD-MCNC: 39 MG/DL (ref 5–21)
BUN/CREAT SERPL: 39.4 (ref 7–25)
CALCIUM SPEC-SCNC: 9.4 MG/DL (ref 8.4–10.4)
CHLORIDE SERPL-SCNC: 96 MMOL/L (ref 98–110)
CO2 SERPL-SCNC: 30 MMOL/L (ref 24–31)
CREAT BLD-MCNC: 0.99 MG/DL (ref 0.5–1.4)
DEPRECATED RDW RBC AUTO: 47.5 FL (ref 40–54)
EOSINOPHIL # BLD AUTO: 0.11 10*3/MM3 (ref 0–0.7)
EOSINOPHIL NFR BLD AUTO: 1.4 % (ref 0–4)
ERYTHROCYTE [DISTWIDTH] IN BLOOD BY AUTOMATED COUNT: 15.5 % (ref 12–15)
GFR SERPL CREATININE-BSD FRML MDRD: 77 ML/MIN/1.73
GLOBULIN UR ELPH-MCNC: 3.4 GM/DL
GLUCOSE BLD-MCNC: 72 MG/DL (ref 70–100)
HCT VFR BLD AUTO: 27.7 % (ref 40–52)
HGB BLD-MCNC: 8.7 G/DL (ref 14–18)
HOLD SPECIMEN: NORMAL
HOLD SPECIMEN: NORMAL
IMM GRANULOCYTES # BLD: 0.02 10*3/MM3 (ref 0–0.03)
IMM GRANULOCYTES NFR BLD: 0.3 % (ref 0–5)
LYMPHOCYTES # BLD AUTO: 1.11 10*3/MM3 (ref 0.72–4.86)
LYMPHOCYTES NFR BLD AUTO: 14.5 % (ref 15–45)
MCH RBC QN AUTO: 26.6 PG (ref 28–32)
MCHC RBC AUTO-ENTMCNC: 31.4 G/DL (ref 33–36)
MCV RBC AUTO: 84.7 FL (ref 82–95)
MONOCYTES # BLD AUTO: 0.66 10*3/MM3 (ref 0.19–1.3)
MONOCYTES NFR BLD AUTO: 8.6 % (ref 4–12)
NEUTROPHILS # BLD AUTO: 5.71 10*3/MM3 (ref 1.87–8.4)
NEUTROPHILS NFR BLD AUTO: 74.5 % (ref 39–78)
NRBC BLD MANUAL-RTO: 0 /100 WBC (ref 0–0)
PLATELET # BLD AUTO: 410 10*3/MM3 (ref 130–400)
PMV BLD AUTO: 9.3 FL (ref 6–12)
POTASSIUM BLD-SCNC: 4.3 MMOL/L (ref 3.5–5.3)
PROT SERPL-MCNC: 7.8 G/DL (ref 6.3–8.7)
RBC # BLD AUTO: 3.27 10*6/MM3 (ref 4.8–5.9)
SODIUM BLD-SCNC: 137 MMOL/L (ref 135–145)
WBC NRBC COR # BLD: 7.66 10*3/MM3 (ref 4.8–10.8)

## 2018-11-08 PROCEDURE — 80053 COMPREHEN METABOLIC PANEL: CPT | Performed by: INTERNAL MEDICINE

## 2018-11-08 PROCEDURE — 36415 COLL VENOUS BLD VENIPUNCTURE: CPT

## 2018-11-08 PROCEDURE — 85025 COMPLETE CBC W/AUTO DIFF WBC: CPT | Performed by: INTERNAL MEDICINE

## 2018-11-08 PROCEDURE — 99214 OFFICE O/P EST MOD 30 MIN: CPT | Performed by: INTERNAL MEDICINE

## 2018-11-08 RX ORDER — HYDROCODONE BITARTRATE AND ACETAMINOPHEN 10; 325 MG/1; MG/1
1 TABLET ORAL EVERY 6 HOURS PRN
Qty: 120 TABLET | Refills: 0 | Status: SHIPPED | OUTPATIENT
Start: 2018-11-08 | End: 2018-11-08 | Stop reason: SDUPTHER

## 2018-11-08 RX ORDER — HYDROCODONE BITARTRATE AND ACETAMINOPHEN 10; 325 MG/1; MG/1
1 TABLET ORAL EVERY 6 HOURS PRN
Qty: 120 TABLET | Refills: 0 | Status: SHIPPED | OUTPATIENT
Start: 2018-11-08 | End: 2018-12-07 | Stop reason: SDUPTHER

## 2018-11-08 RX ORDER — SODIUM CHLORIDE 9 MG/ML
250 INJECTION, SOLUTION INTRAVENOUS ONCE
Status: CANCELLED | OUTPATIENT
Start: 2018-12-20

## 2018-11-08 RX ORDER — SODIUM CHLORIDE 9 MG/ML
250 INJECTION, SOLUTION INTRAVENOUS ONCE
Status: CANCELLED | OUTPATIENT
Start: 2018-11-21

## 2018-11-08 NOTE — PROGRESS NOTES
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  Status post major surgeries, including bone graft from his right lower leg to his jaw, chemotherapy and radiation.  Continue to follow-up at [facility].   he has follow-up August 9.  He is due for follow-up CT scan which he will obtain in [place]  His pain is relatively well-controlled on Narco.  He is here today for refill of his normal call.  He is on puréed diet per oral, only uses his PEG tube for medication.  He denies fever chills, denies nausea or vomiting, denies headache.  He has maintained his weight.  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 He Misbah new symptom, n/v/cp/sob/dizziness/     Past Medical History:   Past Medical History:   Diagnosis Date   • Abdominal fistula    • Dysphonia    • Encounter for antineoplastic chemotherapy    • Encounter for long-term (current) use of medications    • Foot drop    • Iron deficiency anemia, unspecified    • Squamous cell cancer of tongue (CMS/HCC)      Past Surgical History:   Past Surgical History:   Procedure Laterality Date   • CAROTID STENT     • EXPLORATORY LAPAROTOMY N/A 5/11/2018    Procedure: EXPLORATORY LAPAROTOMY , TAKEDOWN GASTROCUTANEOUS  FISTULA;  Surgeon: Nancie Silveira MD;  Location: [facility];  Service: General   • MOUTH SURGERY     • PORTACATH PLACEMENT     • TONGUE BIOPSY / EXCISION       Social History:   Social History     Social History   • Marital status: Single     Spouse name: N/A   • Number of children: N/A   • Years of education: N/A     Occupational History   • Not on file.     Social History Main Topics   • Smoking status: Former Smoker     Types: Cigarettes   • Smokeless tobacco: Never Used      Comment: quit nov 2013   • Alcohol use  "No   • Drug use: No   • Sexual activity: Defer     Other Topics Concern   • Not on file     Social History Narrative   • No narrative on file     Family History:   Family History   Problem Relation Age of Onset   • No Known Problems Mother    • Heart failure Father    • No Known Problems Daughter    • No Known Problems Son    • No Known Problems Daughter        Review of Systems   Constitutional: Negative.    HENT: Negative for congestion, dental problem, drooling, ear discharge, ear pain, facial swelling, hearing loss, mouth sores, sinus pressure, sore throat, tinnitus, trouble swallowing and voice change.    Eyes: Negative for photophobia, pain, discharge, redness, itching and visual disturbance.   Respiratory: Negative for cough, choking, chest tightness, shortness of breath, wheezing and stridor.    Cardiovascular: Negative.    Gastrointestinal: Negative for abdominal distention, abdominal pain, constipation, diarrhea, nausea and vomiting.   Endocrine: Negative for polyuria.   Genitourinary: Negative for flank pain and hematuria.   Musculoskeletal: Negative.    Skin: Negative for color change.   Neurological: Negative for dizziness, tremors, seizures, syncope, facial asymmetry, speech difficulty, weakness, light-headedness, numbness and headaches.   Hematological: Negative for adenopathy. Does not bruise/bleed easily.   Psychiatric/Behavioral: Negative for behavioral problems and confusion.        Medications:  The current medication list was reviewed in the EMR    ALLERGIES:    Allergies   Allergen Reactions   • Codeine Swelling     Swelling of throat       Objective      Vitals:    11/08/18 0759   BP: 134/82   Pulse: 64   Resp: 18   Temp: 97.5 °F (36.4 °C)   TempSrc: Tympanic   SpO2: 90%   Weight: 60.2 kg (132 lb 12.8 oz)   Height: 177.8 cm (70\")       Current Status 11/8/2018   ECOG score 2         General Appearance:   Cachectic-appearing, Alert, cooperative, no distress, appears stated age   Head:    " Normocephalic, without obvious abnormality, atraumatic   Eyes:    PERRL, conjunctiva/corneas clear, EOM's intact, fundi     benign, both eyes   Ears:    Normal TM's and external ear canals, both ears   Nose:   Nares normal, septum midline, mucosa normal, no drainage     or sinus tenderness   Throat:   Lips, mucosa, and tongue normal; teeth and gums normal   Neck:   Surgical scar well-healed, radiation changes, scab on the left upper jaw. no adenopathy;     thyroid:  no enlargement/tenderness/nodules; no carotid    bruit or JVD   Back:     Symmetric, no curvature, ROM normal, no CVA tenderness   Lungs:     Clear to auscultation bilaterally, respirations unlabored   Chest Wall:    No tenderness or deformity    Heart:    Regular rate and rhythm, S1 and S2 normal, no murmur, rub    or gallop   Abdomen:     Soft, non-tender, bowel sounds active all four quadrants,     no masses, no organomegaly.PEG tube intact    Extremities:   Extremities normal, atraumatic, no cyanosis or edema   Pulses:   2+ and symmetric all extremities   Skin:   Skin color, texture, turgor normal, no rashes or lesions   Lymph nodes:   Cervical, supraclavicular, and axillary nodes normal   Neurologic:   CNII-XII intact, normal strength, sensation and reflexes     throughout       RECENT LABS:  Lab Results (last 24 hours)     Procedure Component Value Units Date/Time    CBC & Differential [[id number]] Collected:  11/08/18 0743    Specimen:  Blood Updated:  11/08/18 0752    Narrative:       The following orders were created for panel order CBC & Differential.  Procedure                               Abnormality         Status                     ---------                               -----------         ------                     CBC Auto Differential[[id number]]        Abnormal            Final result                 Please view results for these tests on the individual orders.    Comprehensive Metabolic Panel [[id number]]  (Abnormal) Collected:   11/08/18 0743    Specimen:  Blood Updated:  11/08/18 0802     Glucose 72 mg/dL      BUN 39 (H) mg/dL      Creatinine 0.99 mg/dL      Sodium 137 mmol/L      Potassium 4.3 mmol/L      Chloride 96 (L) mmol/L      CO2 30.0 mmol/L      Calcium 9.4 mg/dL      Total Protein 7.8 g/dL      Albumin 4.40 g/dL      ALT (SGPT) 19 U/L      AST (SGOT) 72 (H) U/L      Alkaline Phosphatase 83 U/L      Total Bilirubin 0.4 mg/dL      eGFR Non African Amer 77 mL/min/1.73      Globulin 3.4 gm/dL      A/G Ratio 1.3 g/dL      BUN/Creatinine Ratio 39.4 (H)     Anion Gap 11.0 mmol/L     CBC Auto Differential [[id number]]  (Abnormal) Collected:  11/08/18 0743    Specimen:  Blood Updated:  11/08/18 0752     WBC 7.66 10*3/mm3      RBC 3.27 (L) 10*6/mm3      Hemoglobin 8.7 (L) g/dL      Hematocrit 27.7 (L) %      MCV 84.7 fL      MCH 26.6 (L) pg      MCHC 31.4 (L) g/dL      RDW 15.5 (H) %      RDW-SD 47.5 fl      MPV 9.3 fL      Platelets 410 (H) 10*3/mm3      Neutrophil % 74.5 %      Lymphocyte % 14.5 (L) %      Monocyte % 8.6 %      Eosinophil % 1.4 %      Basophil % 0.7 %      Immature Grans % 0.3 %      Neutrophils, Absolute 5.71 10*3/mm3      Lymphocytes, Absolute 1.11 10*3/mm3      Monocytes, Absolute 0.66 10*3/mm3      Eosinophils, Absolute 0.11 10*3/mm3      Basophils, Absolute 0.05 10*3/mm3      Immature Grans, Absolute 0.02 10*3/mm3      nRBC 0.0 /100 WBC           RADIOLOGY:  No results found.      VTE prophylaxis reviewed and addressed    Assessment/Plan  57-year-old male diagnosed with moderately differentiated invasive squamous cell carcinoma of the oral cavity stage IV a status post major surgeries including jaw transplant from a bone in his right leg in 2013, status post chemotherapy and radiation, currently CLARK.  Continue follow-up at the [facility] in [place]. CT due at that visit .  We will  see him in 3 months unless things change.  Jaw pain: Continue Narco refilled today.    2 -Anemia: low %saturation, iron 49,  folate 4.8, b12 426. He is on ferrous sulfate without adequate rise, Hg 8.7. Will plan iv injectafer.     3. CAD: on plavix    4. Pain from cancer: on norco refilled           Obiageli Dominick Kelsey MD    11/8/2018    8:16 AM    Physical Exam

## 2018-11-14 ENCOUNTER — APPOINTMENT (OUTPATIENT)
Dept: ONCOLOGY | Facility: HOSPITAL | Age: 58
End: 2018-11-14

## 2018-11-21 ENCOUNTER — INFUSION (OUTPATIENT)
Dept: ONCOLOGY | Facility: HOSPITAL | Age: 58
End: 2018-11-21
Attending: INTERNAL MEDICINE

## 2018-11-21 DIAGNOSIS — K90.9 INTESTINAL MALABSORPTION, UNSPECIFIED TYPE: ICD-10-CM

## 2018-11-21 DIAGNOSIS — D50.9 IRON DEFICIENCY ANEMIA, UNSPECIFIED IRON DEFICIENCY ANEMIA TYPE: ICD-10-CM

## 2018-12-07 DIAGNOSIS — K90.9 INTESTINAL MALABSORPTION, UNSPECIFIED TYPE: ICD-10-CM

## 2018-12-07 DIAGNOSIS — D50.9 IRON DEFICIENCY ANEMIA, UNSPECIFIED IRON DEFICIENCY ANEMIA TYPE: ICD-10-CM

## 2018-12-07 RX ORDER — SODIUM CHLORIDE 9 MG/ML
250 INJECTION, SOLUTION INTRAVENOUS ONCE
Status: CANCELLED | OUTPATIENT
Start: 2018-12-13

## 2018-12-07 RX ORDER — HYDROCODONE BITARTRATE AND ACETAMINOPHEN 10; 325 MG/1; MG/1
1 TABLET ORAL EVERY 6 HOURS PRN
Qty: 120 TABLET | Refills: 0 | Status: SHIPPED | OUTPATIENT
Start: 2018-12-07 | End: 2019-01-08 | Stop reason: SDUPTHER

## 2018-12-13 ENCOUNTER — INFUSION (OUTPATIENT)
Dept: ONCOLOGY | Facility: HOSPITAL | Age: 58
End: 2018-12-13
Attending: INTERNAL MEDICINE

## 2018-12-13 VITALS
DIASTOLIC BLOOD PRESSURE: 56 MMHG | HEART RATE: 65 BPM | HEIGHT: 70 IN | SYSTOLIC BLOOD PRESSURE: 114 MMHG | TEMPERATURE: 98.1 F | WEIGHT: 135 LBS | OXYGEN SATURATION: 99 % | BODY MASS INDEX: 19.33 KG/M2 | RESPIRATION RATE: 18 BRPM

## 2018-12-13 DIAGNOSIS — Z95.828 PORT-A-CATH IN PLACE: ICD-10-CM

## 2018-12-13 DIAGNOSIS — D50.8 IRON DEFICIENCY ANEMIA SECONDARY TO INADEQUATE DIETARY IRON INTAKE: ICD-10-CM

## 2018-12-13 DIAGNOSIS — D50.9 IRON DEFICIENCY ANEMIA, UNSPECIFIED IRON DEFICIENCY ANEMIA TYPE: ICD-10-CM

## 2018-12-13 DIAGNOSIS — K90.9 INTESTINAL MALABSORPTION, UNSPECIFIED TYPE: ICD-10-CM

## 2018-12-13 DIAGNOSIS — D50.9 IRON DEFICIENCY ANEMIA, UNSPECIFIED IRON DEFICIENCY ANEMIA TYPE: Primary | ICD-10-CM

## 2018-12-13 PROCEDURE — 25010000002 FERRIC CARBOXYMALTOSE 750 MG/15ML SOLUTION 15 ML VIAL: Performed by: INTERNAL MEDICINE

## 2018-12-13 PROCEDURE — 96365 THER/PROPH/DIAG IV INF INIT: CPT

## 2018-12-13 RX ORDER — SODIUM CHLORIDE 9 MG/ML
250 INJECTION, SOLUTION INTRAVENOUS ONCE
Status: COMPLETED | OUTPATIENT
Start: 2018-12-13 | End: 2018-12-13

## 2018-12-13 RX ORDER — HEPARIN SODIUM (PORCINE) LOCK FLUSH IV SOLN 100 UNIT/ML 100 UNIT/ML
300 SOLUTION INTRAVENOUS ONCE
Status: CANCELLED | OUTPATIENT
Start: 2018-12-13

## 2018-12-13 RX ORDER — SODIUM CHLORIDE 0.9 % (FLUSH) 0.9 %
10 SYRINGE (ML) INJECTION AS NEEDED
Status: DISCONTINUED | OUTPATIENT
Start: 2018-12-13 | End: 2018-12-13 | Stop reason: HOSPADM

## 2018-12-13 RX ORDER — SODIUM CHLORIDE 0.9 % (FLUSH) 0.9 %
10 SYRINGE (ML) INJECTION AS NEEDED
Status: CANCELLED | OUTPATIENT
Start: 2018-12-13

## 2018-12-13 RX ADMIN — SODIUM CHLORIDE 250 ML: 9 INJECTION, SOLUTION INTRAVENOUS at 10:10

## 2018-12-13 RX ADMIN — FERRIC CARBOXYMALTOSE INJECTION 750 MG: 50 INJECTION, SOLUTION INTRAVENOUS at 10:14

## 2018-12-13 RX ADMIN — Medication 500 UNITS: at 11:15

## 2018-12-20 ENCOUNTER — INFUSION (OUTPATIENT)
Dept: ONCOLOGY | Facility: HOSPITAL | Age: 58
End: 2018-12-20
Attending: INTERNAL MEDICINE

## 2018-12-20 VITALS
WEIGHT: 135 LBS | RESPIRATION RATE: 20 BRPM | HEIGHT: 70 IN | SYSTOLIC BLOOD PRESSURE: 128 MMHG | BODY MASS INDEX: 19.33 KG/M2 | HEART RATE: 77 BPM | TEMPERATURE: 98.2 F | OXYGEN SATURATION: 95 % | DIASTOLIC BLOOD PRESSURE: 62 MMHG

## 2018-12-20 DIAGNOSIS — Z95.828 PORT-A-CATH IN PLACE: ICD-10-CM

## 2018-12-20 DIAGNOSIS — K90.9 INTESTINAL MALABSORPTION, UNSPECIFIED TYPE: Primary | ICD-10-CM

## 2018-12-20 DIAGNOSIS — D50.9 IRON DEFICIENCY ANEMIA, UNSPECIFIED IRON DEFICIENCY ANEMIA TYPE: ICD-10-CM

## 2018-12-20 DIAGNOSIS — D50.8 IRON DEFICIENCY ANEMIA SECONDARY TO INADEQUATE DIETARY IRON INTAKE: ICD-10-CM

## 2018-12-20 PROCEDURE — 25010000002 FERRIC CARBOXYMALTOSE 750 MG/15ML SOLUTION 15 ML VIAL: Performed by: INTERNAL MEDICINE

## 2018-12-20 PROCEDURE — 96365 THER/PROPH/DIAG IV INF INIT: CPT

## 2018-12-20 PROCEDURE — 96374 THER/PROPH/DIAG INJ IV PUSH: CPT

## 2018-12-20 RX ORDER — SODIUM CHLORIDE 0.9 % (FLUSH) 0.9 %
10 SYRINGE (ML) INJECTION AS NEEDED
Status: CANCELLED | OUTPATIENT
Start: 2018-12-20

## 2018-12-20 RX ORDER — SODIUM CHLORIDE 9 MG/ML
250 INJECTION, SOLUTION INTRAVENOUS ONCE
Status: COMPLETED | OUTPATIENT
Start: 2018-12-20 | End: 2018-12-20

## 2018-12-20 RX ORDER — SODIUM CHLORIDE 0.9 % (FLUSH) 0.9 %
10 SYRINGE (ML) INJECTION AS NEEDED
Status: DISCONTINUED | OUTPATIENT
Start: 2018-12-20 | End: 2018-12-20 | Stop reason: HOSPADM

## 2018-12-20 RX ADMIN — Medication 500 UNITS: at 12:02

## 2018-12-20 RX ADMIN — SODIUM CHLORIDE, PRESERVATIVE FREE 10 ML: 5 INJECTION INTRAVENOUS at 12:01

## 2018-12-20 RX ADMIN — SODIUM CHLORIDE 250 ML: 9 INJECTION, SOLUTION INTRAVENOUS at 11:04

## 2018-12-20 RX ADMIN — FERRIC CARBOXYMALTOSE INJECTION 750 MG: 50 INJECTION, SOLUTION INTRAVENOUS at 11:05

## 2019-01-01 ENCOUNTER — APPOINTMENT (OUTPATIENT)
Dept: LAB | Facility: HOSPITAL | Age: 59
End: 2019-01-01

## 2019-01-01 ENCOUNTER — INFUSION (OUTPATIENT)
Dept: ONCOLOGY | Facility: HOSPITAL | Age: 59
End: 2019-01-01
Attending: INTERNAL MEDICINE

## 2019-01-01 ENCOUNTER — OFFICE VISIT (OUTPATIENT)
Dept: ONCOLOGY | Facility: CLINIC | Age: 59
End: 2019-01-01

## 2019-01-01 ENCOUNTER — LAB (OUTPATIENT)
Dept: LAB | Facility: HOSPITAL | Age: 59
End: 2019-01-01

## 2019-01-01 VITALS
OXYGEN SATURATION: 96 % | HEART RATE: 68 BPM | DIASTOLIC BLOOD PRESSURE: 57 MMHG | RESPIRATION RATE: 16 BRPM | TEMPERATURE: 98.8 F | WEIGHT: 121 LBS | HEIGHT: 70 IN | SYSTOLIC BLOOD PRESSURE: 138 MMHG | BODY MASS INDEX: 17.32 KG/M2

## 2019-01-01 VITALS
WEIGHT: 131.1 LBS | HEART RATE: 96 BPM | BODY MASS INDEX: 18.77 KG/M2 | HEIGHT: 70 IN | RESPIRATION RATE: 16 BRPM | OXYGEN SATURATION: 93 % | TEMPERATURE: 98 F | SYSTOLIC BLOOD PRESSURE: 184 MMHG | DIASTOLIC BLOOD PRESSURE: 88 MMHG

## 2019-01-01 VITALS
SYSTOLIC BLOOD PRESSURE: 162 MMHG | HEIGHT: 70 IN | RESPIRATION RATE: 16 BRPM | HEART RATE: 88 BPM | WEIGHT: 125 LBS | OXYGEN SATURATION: 94 % | BODY MASS INDEX: 17.9 KG/M2 | DIASTOLIC BLOOD PRESSURE: 84 MMHG

## 2019-01-01 VITALS
TEMPERATURE: 97.8 F | HEIGHT: 70 IN | BODY MASS INDEX: 18.75 KG/M2 | OXYGEN SATURATION: 92 % | SYSTOLIC BLOOD PRESSURE: 144 MMHG | HEART RATE: 99 BPM | DIASTOLIC BLOOD PRESSURE: 64 MMHG | WEIGHT: 131 LBS | RESPIRATION RATE: 18 BRPM

## 2019-01-01 DIAGNOSIS — C76.0 HEAD AND NECK CANCER (HCC): ICD-10-CM

## 2019-01-01 DIAGNOSIS — C76.0 HEAD AND NECK CANCER (HCC): Primary | ICD-10-CM

## 2019-01-01 DIAGNOSIS — D50.8 IRON DEFICIENCY ANEMIA SECONDARY TO INADEQUATE DIETARY IRON INTAKE: ICD-10-CM

## 2019-01-01 DIAGNOSIS — D50.9 IRON DEFICIENCY ANEMIA, UNSPECIFIED IRON DEFICIENCY ANEMIA TYPE: ICD-10-CM

## 2019-01-01 DIAGNOSIS — G89.3 CANCER ASSOCIATED PAIN: Primary | ICD-10-CM

## 2019-01-01 DIAGNOSIS — Z95.828 PORT-A-CATH IN PLACE: ICD-10-CM
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
## 2019-01-01 DIAGNOSIS — K90.9 INTESTINAL MALABSORPTION, UNSPECIFIED TYPE: ICD-10-CM

## 2019-01-01 DIAGNOSIS — G89.28 CHRONIC PAIN AFTER RADIOTHERAPY: ICD-10-CM

## 2019-01-01 DIAGNOSIS — D50.9 IRON DEFICIENCY ANEMIA, UNSPECIFIED IRON DEFICIENCY ANEMIA TYPE: Primary | ICD-10-CM

## 2019-01-01 LAB
ALBUMIN SERPL-MCNC: 4.5 G/DL (ref 3.5–5)
ALBUMIN SERPL-MCNC: 4.6 G/DL (ref 3.5–5)
ALBUMIN SERPL-MCNC: 4.6 G/DL (ref 3.5–5.2)
ALBUMIN/GLOB SERPL: 1.2 G/DL
ALBUMIN/GLOB SERPL: 1.3 G/DL (ref 1.1–2.5)
ALBUMIN/GLOB SERPL: 1.4 G/DL (ref 1.1–2.5)
ALP SERPL-CCNC: 102 U/L (ref 24–120)
ALP SERPL-CCNC: 104 U/L (ref 39–117)
ALP SERPL-CCNC: 94 U/L (ref 24–120)
ALT SERPL W P-5'-P-CCNC: 14 U/L (ref 1–41)
ALT SERPL W P-5'-P-CCNC: <15 U/L (ref 0–54)
ALT SERPL W P-5'-P-CCNC: <15 U/L (ref 0–54)
ANION GAP SERPL CALCULATED.3IONS-SCNC: 10 MMOL/L (ref 4–13)
ANION GAP SERPL CALCULATED.3IONS-SCNC: 9 MMOL/L (ref 4–13)
ANION GAP SERPL CALCULATED.3IONS-SCNC: 9 MMOL/L (ref 5–15)
AST SERPL-CCNC: 16 U/L (ref 1–40)
AST SERPL-CCNC: 37 U/L (ref 7–45)
AST SERPL-CCNC: 42 U/L (ref 7–45)
BASOPHILS # BLD AUTO: 0.04 10*3/MM3 (ref 0–0.2)
BASOPHILS # BLD AUTO: 0.05 10*3/MM3 (ref 0–0.2)
BASOPHILS # BLD AUTO: 0.08 10*3/MM3 (ref 0–0.2)
BASOPHILS NFR BLD AUTO: 0.5 % (ref 0–2)
BASOPHILS NFR BLD AUTO: 0.6 % (ref 0–1.5)
BASOPHILS NFR BLD AUTO: 1.3 % (ref 0–2)
BILIRUB SERPL-MCNC: 0.6 MG/DL (ref 0.2–1.2)
BILIRUB SERPL-MCNC: 0.7 MG/DL (ref 0.1–1)
BILIRUB SERPL-MCNC: 0.8 MG/DL (ref 0.1–1)
BUN BLD-MCNC: 14 MG/DL (ref 8–23)
BUN BLD-MCNC: 19 MG/DL (ref 5–21)
BUN BLD-MCNC: 22 MG/DL (ref 5–21)
BUN/CREAT SERPL: 21.9 (ref 7–25)
BUN/CREAT SERPL: 31.1 (ref 7–25)
BUN/CREAT SERPL: 32.8 (ref 7–25)
CALCIUM SPEC-SCNC: 10 MG/DL (ref 8.4–10.4)
CALCIUM SPEC-SCNC: 10 MG/DL (ref 8.6–10.5)
CALCIUM SPEC-SCNC: 9.4 MG/DL (ref 8.4–10.4)
CHLORIDE SERPL-SCNC: 90 MMOL/L (ref 98–107)
CHLORIDE SERPL-SCNC: 94 MMOL/L (ref 98–110)
CHLORIDE SERPL-SCNC: 96 MMOL/L (ref 98–110)
CO2 SERPL-SCNC: 30 MMOL/L (ref 24–31)
CO2 SERPL-SCNC: 31 MMOL/L (ref 24–31)
CO2 SERPL-SCNC: 33 MMOL/L (ref 22–29)
CREAT BLD-MCNC: 0.61 MG/DL (ref 0.5–1.4)
CREAT BLD-MCNC: 0.64 MG/DL (ref 0.76–1.27)
CREAT BLD-MCNC: 0.67 MG/DL (ref 0.5–1.4)
DEPRECATED RDW RBC AUTO: 45.2 FL (ref 37–54)
DEPRECATED RDW RBC AUTO: 50.4 FL (ref 40–54)
DEPRECATED RDW RBC AUTO: 55.9 FL (ref 40–54)
EOSINOPHIL # BLD AUTO: 0.3 10*3/MM3 (ref 0–0.4)
EOSINOPHIL # BLD AUTO: 0.37 10*3/MM3 (ref 0–0.7)
EOSINOPHIL # BLD AUTO: 0.43 10*3/MM3 (ref 0–0.7)
EOSINOPHIL NFR BLD AUTO: 3.6 % (ref 0.3–6.2)
EOSINOPHIL NFR BLD AUTO: 4.4 % (ref 0–4)
EOSINOPHIL NFR BLD AUTO: 6.8 % (ref 0–4)
ERYTHROCYTE [DISTWIDTH] IN BLOOD BY AUTOMATED COUNT: 13.3 % (ref 12.3–15.4)
ERYTHROCYTE [DISTWIDTH] IN BLOOD BY AUTOMATED COUNT: 14.3 % (ref 12–15)
ERYTHROCYTE [DISTWIDTH] IN BLOOD BY AUTOMATED COUNT: 16.1 % (ref 12–15)
FERRITIN SERPL-MCNC: 521 NG/ML (ref 17.9–464)
FERRITIN SERPL-MCNC: 639 NG/ML (ref 17.9–464)
GFR SERPL CREATININE-BSD FRML MDRD: 121 ML/MIN/1.73
GFR SERPL CREATININE-BSD FRML MDRD: 127 ML/MIN/1.73
GFR SERPL CREATININE-BSD FRML MDRD: 134 ML/MIN/1.73
GLOBULIN UR ELPH-MCNC: 3.3 GM/DL
GLOBULIN UR ELPH-MCNC: 3.6 GM/DL
GLOBULIN UR ELPH-MCNC: 3.8 GM/DL
GLUCOSE BLD-MCNC: 103 MG/DL (ref 70–100)
GLUCOSE BLD-MCNC: 85 MG/DL (ref 65–99)
GLUCOSE BLD-MCNC: 93 MG/DL (ref 70–100)
HCT VFR BLD AUTO: 43.8 % (ref 40–52)
HCT VFR BLD AUTO: 45.9 % (ref 40–52)
HCT VFR BLD AUTO: 46.5 % (ref 37.5–51)
HGB BLD-MCNC: 14.1 G/DL (ref 14–18)
HGB BLD-MCNC: 15.1 G/DL (ref 14–18)
HGB BLD-MCNC: 15.6 G/DL (ref 13–17.7)
HOLD SPECIMEN: NORMAL
IMM GRANULOCYTES # BLD AUTO: 0.02 10*3/MM3 (ref 0–0.05)
IMM GRANULOCYTES # BLD AUTO: 0.02 10*3/MM3 (ref 0–0.05)
IMM GRANULOCYTES # BLD AUTO: 0.04 10*3/MM3 (ref 0–0.05)
IMM GRANULOCYTES NFR BLD AUTO: 0.2 % (ref 0–5)
IMM GRANULOCYTES NFR BLD AUTO: 0.3 % (ref 0–5)
IMM GRANULOCYTES NFR BLD AUTO: 0.5 % (ref 0–0.5)
IRON 24H UR-MRATE: 151 MCG/DL (ref 42–180)
IRON 24H UR-MRATE: 84 MCG/DL (ref 42–180)
IRON SATN MFR SERPL: 23 % (ref 20–45)
IRON SATN MFR SERPL: 44 % (ref 20–45)
LYMPHOCYTES # BLD AUTO: 0.98 10*3/MM3 (ref 0.72–4.86)
LYMPHOCYTES # BLD AUTO: 1.13 10*3/MM3 (ref 0.72–4.86)
LYMPHOCYTES # BLD AUTO: 1.46 10*3/MM3 (ref 0.7–3.1)
LYMPHOCYTES NFR BLD AUTO: 11.6 % (ref 15–45)
LYMPHOCYTES NFR BLD AUTO: 17.6 % (ref 19.6–45.3)
LYMPHOCYTES NFR BLD AUTO: 18 % (ref 15–45)
MCH RBC QN AUTO: 30.5 PG (ref 28–32)
MCH RBC QN AUTO: 31.1 PG (ref 26.6–33)
MCH RBC QN AUTO: 31.6 PG (ref 28–32)
MCHC RBC AUTO-ENTMCNC: 32.2 G/DL (ref 33–36)
MCHC RBC AUTO-ENTMCNC: 32.9 G/DL (ref 33–36)
MCHC RBC AUTO-ENTMCNC: 33.5 G/DL (ref 31.5–35.7)
MCV RBC AUTO: 92.6 FL (ref 79–97)
MCV RBC AUTO: 94.8 FL (ref 82–95)
MCV RBC AUTO: 96 FL (ref 82–95)
MONOCYTES # BLD AUTO: 0.53 10*3/MM3 (ref 0.19–1.3)
MONOCYTES # BLD AUTO: 0.56 10*3/MM3 (ref 0.19–1.3)
MONOCYTES # BLD AUTO: 0.62 10*3/MM3 (ref 0.1–0.9)
MONOCYTES NFR BLD AUTO: 6.3 % (ref 4–12)
MONOCYTES NFR BLD AUTO: 7.5 % (ref 5–12)
MONOCYTES NFR BLD AUTO: 8.9 % (ref 4–12)
NEUTROPHILS # BLD AUTO: 4.07 10*3/MM3 (ref 1.87–8.4)
NEUTROPHILS # BLD AUTO: 5.81 10*3/MM3 (ref 1.7–7)
NEUTROPHILS # BLD AUTO: 6.48 10*3/MM3 (ref 1.87–8.4)
NEUTROPHILS NFR BLD AUTO: 64.7 % (ref 39–78)
NEUTROPHILS NFR BLD AUTO: 70.2 % (ref 42.7–76)
NEUTROPHILS NFR BLD AUTO: 77 % (ref 39–78)
NRBC BLD AUTO-RTO: 0 /100 WBC (ref 0–0)
NRBC BLD AUTO-RTO: 0 /100 WBC (ref 0–0.2)
NRBC BLD AUTO-RTO: 0 /100 WBC (ref 0–0.2)
PLATELET # BLD AUTO: 356 10*3/MM3 (ref 130–400)
PLATELET # BLD AUTO: 385 10*3/MM3 (ref 130–400)
PLATELET # BLD AUTO: 406 10*3/MM3 (ref 140–450)
PMV BLD AUTO: 8.9 FL (ref 6–12)
PMV BLD AUTO: 9.2 FL (ref 6–12)
PMV BLD AUTO: 9.2 FL (ref 6–12)
POTASSIUM BLD-SCNC: 4.3 MMOL/L (ref 3.5–5.2)
POTASSIUM BLD-SCNC: 4.5 MMOL/L (ref 3.5–5.3)
POTASSIUM BLD-SCNC: 4.9 MMOL/L (ref 3.5–5.3)
PROT SERPL-MCNC: 7.9 G/DL (ref 6.3–8.7)
PROT SERPL-MCNC: 8.1 G/DL (ref 6.3–8.7)
PROT SERPL-MCNC: 8.4 G/DL (ref 6–8.5)
RBC # BLD AUTO: 4.62 10*6/MM3 (ref 4.8–5.9)
RBC # BLD AUTO: 4.78 10*6/MM3 (ref 4.8–5.9)
RBC # BLD AUTO: 5.02 10*6/MM3 (ref 4.14–5.8)
SODIUM BLD-SCNC: 132 MMOL/L (ref 136–145)
SODIUM BLD-SCNC: 134 MMOL/L (ref 135–145)
SODIUM BLD-SCNC: 136 MMOL/L (ref 135–145)
TIBC SERPL-MCNC: 342 MCG/DL (ref 225–420)
TIBC SERPL-MCNC: 358 MCG/DL (ref 225–420)
WBC NRBC COR # BLD: 6.29 10*3/MM3 (ref 4.8–10.8)
WBC NRBC COR # BLD: 8.28 10*3/MM3 (ref 3.4–10.8)
WBC NRBC COR # BLD: 8.42 10*3/MM3 (ref 4.8–10.8)

## 2019-01-01 PROCEDURE — 83550 IRON BINDING TEST: CPT | Performed by: INTERNAL MEDICINE

## 2019-01-01 PROCEDURE — 80053 COMPREHEN METABOLIC PANEL: CPT | Performed by: INTERNAL MEDICINE

## 2019-01-01 PROCEDURE — 36415 COLL VENOUS BLD VENIPUNCTURE: CPT

## 2019-01-01 PROCEDURE — 99214 OFFICE O/P EST MOD 30 MIN: CPT | Performed by: INTERNAL MEDICINE

## 2019-01-01 PROCEDURE — 36415 COLL VENOUS BLD VENIPUNCTURE: CPT | Performed by: INTERNAL MEDICINE

## 2019-01-01 PROCEDURE — 25010000002 FERRIC CARBOXYMALTOSE 750 MG/15ML SOLUTION 15 ML VIAL: Performed by: INTERNAL MEDICINE

## 2019-01-01 PROCEDURE — 85025 COMPLETE CBC W/AUTO DIFF WBC: CPT | Performed by: INTERNAL MEDICINE

## 2019-01-01 PROCEDURE — 96365 THER/PROPH/DIAG IV INF INIT: CPT

## 2019-01-01 PROCEDURE — 82728 ASSAY OF FERRITIN: CPT | Performed by: INTERNAL MEDICINE

## 2019-01-01 PROCEDURE — 85025 COMPLETE CBC W/AUTO DIFF WBC: CPT

## 2019-01-01 PROCEDURE — 80053 COMPREHEN METABOLIC PANEL: CPT

## 2019-01-01 PROCEDURE — 83540 ASSAY OF IRON: CPT | Performed by: INTERNAL MEDICINE

## 2019-01-01 RX ORDER — ALBUTEROL SULFATE 2.5 MG/3ML
2.5 SOLUTION RESPIRATORY (INHALATION)
COMMUNITY
Start: 2019-01-01 | End: 2019-01-01 | Stop reason: SDUPTHER

## 2019-01-01 RX ORDER — SODIUM CHLORIDE 0.9 % (FLUSH) 0.9 %
10 SYRINGE (ML) INJECTION AS NEEDED
Status: CANCELLED | OUTPATIENT
Start: 2019-01-01

## 2019-01-01 RX ORDER — DIPHENHYDRAMINE HYDROCHLORIDE 50 MG/ML
50 INJECTION INTRAMUSCULAR; INTRAVENOUS AS NEEDED
Status: DISCONTINUED | OUTPATIENT
Start: 2019-01-01 | End: 2019-01-01 | Stop reason: HOSPADM

## 2019-01-01 RX ORDER — MEPERIDINE HYDROCHLORIDE 50 MG/ML
25 INJECTION INTRAMUSCULAR; INTRAVENOUS; SUBCUTANEOUS
Status: DISCONTINUED | OUTPATIENT
Start: 2019-01-01 | End: 2019-01-01 | Stop reason: HOSPADM

## 2019-01-01 RX ORDER — HEPARIN SODIUM (PORCINE) LOCK FLUSH IV SOLN 100 UNIT/ML 100 UNIT/ML
500 SOLUTION INTRAVENOUS AS NEEDED
Status: CANCELLED | OUTPATIENT
Start: 2019-01-01

## 2019-01-01 RX ORDER — HYDROCODONE BITARTRATE AND ACETAMINOPHEN 10; 325 MG/1; MG/1
1 TABLET ORAL EVERY 6 HOURS PRN
Qty: 120 TABLET | Refills: 0 | Status: SHIPPED | OUTPATIENT
Start: 2019-01-01 | End: 2019-01-01 | Stop reason: SDUPTHER

## 2019-01-01 RX ORDER — SODIUM CHLORIDE 9 MG/ML
250 INJECTION, SOLUTION INTRAVENOUS ONCE
Status: COMPLETED | OUTPATIENT
Start: 2019-01-01 | End: 2019-01-01

## 2019-01-01 RX ORDER — FAMOTIDINE 10 MG/ML
20 INJECTION, SOLUTION INTRAVENOUS AS NEEDED
Status: DISCONTINUED | OUTPATIENT
Start: 2019-01-01 | End: 2019-01-01 | Stop reason: HOSPADM

## 2019-01-01 RX ORDER — SODIUM CHLORIDE 0.9 % (FLUSH) 0.9 %
10 SYRINGE (ML) INJECTION AS NEEDED
Status: DISCONTINUED | OUTPATIENT
Start: 2019-01-01 | End: 2019-01-01 | Stop reason: HOSPADM

## 2019-01-01 RX ORDER — HYDROCODONE BITARTRATE AND ACETAMINOPHEN 10; 325 MG/1; MG/1
1 TABLET ORAL EVERY 6 HOURS PRN
Qty: 120 TABLET | Refills: 0 | Status: SHIPPED | OUTPATIENT
Start: 2019-01-01 | End: 2020-01-01 | Stop reason: SDUPTHER

## 2019-01-01 RX ORDER — ALBUTEROL SULFATE 2.5 MG/3ML
SOLUTION RESPIRATORY (INHALATION)
Refills: 3 | COMMUNITY
Start: 2019-01-01

## 2019-01-01 RX ADMIN — SODIUM CHLORIDE, PRESERVATIVE FREE 10 ML: 5 INJECTION INTRAVENOUS at 13:52

## 2019-01-01 RX ADMIN — Medication 500 UNITS: at 13:52

## 2019-01-01 RX ADMIN — FERRIC CARBOXYMALTOSE INJECTION 750 MG: 50 INJECTION, SOLUTION INTRAVENOUS at 13:18

## 2019-01-01 RX ADMIN — SODIUM CHLORIDE 250 ML: 9 INJECTION, SOLUTION INTRAVENOUS at 13:17

## 2019-01-07 DIAGNOSIS — D50.8 IRON DEFICIENCY ANEMIA SECONDARY TO INADEQUATE DIETARY IRON INTAKE: Primary | ICD-10-CM

## 2019-01-08 ENCOUNTER — LAB (OUTPATIENT)
Dept: LAB | Facility: HOSPITAL | Age: 59
End: 2019-01-08
Attending: INTERNAL MEDICINE

## 2019-01-08 ENCOUNTER — OFFICE VISIT (OUTPATIENT)
Dept: ONCOLOGY | Facility: CLINIC | Age: 59
End: 2019-01-08

## 2019-01-08 VITALS
TEMPERATURE: 98.4 F | OXYGEN SATURATION: 91 % | HEIGHT: 70 IN | RESPIRATION RATE: 18 BRPM | BODY MASS INDEX: 20.04 KG/M2 | DIASTOLIC BLOOD PRESSURE: 74 MMHG | HEART RATE: 85 BPM | SYSTOLIC BLOOD PRESSURE: 138 MMHG | WEIGHT: 140 LBS

## 2019-01-08 DIAGNOSIS — K90.9 INTESTINAL MALABSORPTION, UNSPECIFIED TYPE: ICD-10-CM

## 2019-01-08 DIAGNOSIS — D50.9 IRON DEFICIENCY ANEMIA, UNSPECIFIED IRON DEFICIENCY ANEMIA TYPE: Primary | ICD-10-CM

## 2019-01-08 DIAGNOSIS — D50.8 IRON DEFICIENCY ANEMIA SECONDARY TO INADEQUATE DIETARY IRON INTAKE: Primary | ICD-10-CM

## 2019-01-08 LAB
ALBUMIN SERPL-MCNC: 4.3 G/DL (ref 3.5–5)
ALBUMIN/GLOB SERPL: 1.1 G/DL (ref 1.1–2.5)
ALP SERPL-CCNC: 106 U/L (ref 24–120)
ALT SERPL W P-5'-P-CCNC: <15 U/L (ref 0–54)
ANION GAP SERPL CALCULATED.3IONS-SCNC: 9 MMOL/L (ref 4–13)
AST SERPL-CCNC: 57 U/L (ref 7–45)
BASOPHILS # BLD AUTO: 0.06 10*3/MM3 (ref 0–0.2)
BASOPHILS NFR BLD AUTO: 0.9 % (ref 0–2)
BILIRUB SERPL-MCNC: 0.5 MG/DL (ref 0.1–1)
BUN BLD-MCNC: 19 MG/DL (ref 5–21)
BUN/CREAT SERPL: 27.9 (ref 7–25)
CALCIUM SPEC-SCNC: 9.2 MG/DL (ref 8.4–10.4)
CHLORIDE SERPL-SCNC: 100 MMOL/L (ref 98–110)
CO2 SERPL-SCNC: 28 MMOL/L (ref 24–31)
CREAT BLD-MCNC: 0.68 MG/DL (ref 0.5–1.4)
DEPRECATED RDW RBC AUTO: 59.6 FL (ref 40–54)
EOSINOPHIL # BLD AUTO: 0.34 10*3/MM3 (ref 0–0.7)
EOSINOPHIL NFR BLD AUTO: 4.8 % (ref 0–4)
ERYTHROCYTE [DISTWIDTH] IN BLOOD BY AUTOMATED COUNT: 19.1 % (ref 12–15)
FERRITIN SERPL-MCNC: 570 NG/ML (ref 17.9–464)
GFR SERPL CREATININE-BSD FRML MDRD: 119 ML/MIN/1.73
GLOBULIN UR ELPH-MCNC: 3.8 GM/DL
GLUCOSE BLD-MCNC: 103 MG/DL (ref 70–100)
HCT VFR BLD AUTO: 36.5 % (ref 40–52)
HGB BLD-MCNC: 11.9 G/DL (ref 14–18)
HOLD SPECIMEN: NORMAL
IMM GRANULOCYTES # BLD AUTO: 0.02 10*3/MM3 (ref 0–0.03)
IMM GRANULOCYTES NFR BLD AUTO: 0.3 % (ref 0–5)
IRON 24H UR-MRATE: 82 MCG/DL (ref 42–180)
IRON SATN MFR SERPL: 22 % (ref 20–45)
LYMPHOCYTES # BLD AUTO: 0.81 10*3/MM3 (ref 0.72–4.86)
LYMPHOCYTES NFR BLD AUTO: 11.5 % (ref 15–45)
MCH RBC QN AUTO: 27.9 PG (ref 28–32)
MCHC RBC AUTO-ENTMCNC: 32.6 G/DL (ref 33–36)
MCV RBC AUTO: 85.5 FL (ref 82–95)
MONOCYTES # BLD AUTO: 0.47 10*3/MM3 (ref 0.19–1.3)
MONOCYTES NFR BLD AUTO: 6.7 % (ref 4–12)
NEUTROPHILS # BLD AUTO: 5.35 10*3/MM3 (ref 1.87–8.4)
NEUTROPHILS NFR BLD AUTO: 75.8 % (ref 39–78)
NRBC BLD AUTO-RTO: 0 /100 WBC (ref 0–0)
PLATELET # BLD AUTO: 430 10*3/MM3 (ref 130–400)
PMV BLD AUTO: 9.1 FL (ref 6–12)
POTASSIUM BLD-SCNC: 4.6 MMOL/L (ref 3.5–5.3)
PROT SERPL-MCNC: 8.1 G/DL (ref 6.3–8.7)
RBC # BLD AUTO: 4.27 10*6/MM3 (ref 4.8–5.9)
SODIUM BLD-SCNC: 137 MMOL/L (ref 135–145)
TIBC SERPL-MCNC: 370 MCG/DL (ref 225–420)
WBC NRBC COR # BLD: 7.05 10*3/MM3 (ref 4.8–10.8)

## 2019-01-08 PROCEDURE — 36415 COLL VENOUS BLD VENIPUNCTURE: CPT

## 2019-01-08 PROCEDURE — 80053 COMPREHEN METABOLIC PANEL: CPT

## 2019-01-08 PROCEDURE — 99214 OFFICE O/P EST MOD 30 MIN: CPT | Performed by: INTERNAL MEDICINE

## 2019-01-08 PROCEDURE — 82728 ASSAY OF FERRITIN: CPT

## 2019-01-08 PROCEDURE — 83550 IRON BINDING TEST: CPT

## 2019-01-08 PROCEDURE — 83540 ASSAY OF IRON: CPT

## 2019-01-08 PROCEDURE — 85025 COMPLETE CBC W/AUTO DIFF WBC: CPT

## 2019-01-08 RX ORDER — MEPERIDINE HYDROCHLORIDE 50 MG/ML
25 INJECTION INTRAMUSCULAR; INTRAVENOUS; SUBCUTANEOUS
Status: CANCELLED | OUTPATIENT
Start: 2019-02-15 | End: 2019-01-18

## 2019-01-08 RX ORDER — SODIUM CHLORIDE 9 MG/ML
250 INJECTION, SOLUTION INTRAVENOUS ONCE
Status: CANCELLED | OUTPATIENT
Start: 2019-02-15

## 2019-01-08 RX ORDER — DIPHENHYDRAMINE HYDROCHLORIDE 50 MG/ML
50 INJECTION INTRAMUSCULAR; INTRAVENOUS AS NEEDED
Status: CANCELLED | OUTPATIENT
Start: 2019-01-10

## 2019-01-08 RX ORDER — FAMOTIDINE 10 MG/ML
20 INJECTION, SOLUTION INTRAVENOUS AS NEEDED
Status: CANCELLED | OUTPATIENT
Start: 2019-02-15

## 2019-01-08 RX ORDER — SODIUM CHLORIDE 9 MG/ML
250 INJECTION, SOLUTION INTRAVENOUS ONCE
Status: CANCELLED | OUTPATIENT
Start: 2019-01-10

## 2019-01-08 RX ORDER — DIPHENHYDRAMINE HYDROCHLORIDE 50 MG/ML
50 INJECTION INTRAMUSCULAR; INTRAVENOUS AS NEEDED
Status: CANCELLED | OUTPATIENT
Start: 2019-02-15

## 2019-01-08 RX ORDER — MEPERIDINE HYDROCHLORIDE 50 MG/ML
25 INJECTION INTRAMUSCULAR; INTRAVENOUS; SUBCUTANEOUS
Status: CANCELLED | OUTPATIENT
Start: 2019-01-10 | End: 2019-01-11

## 2019-01-08 RX ORDER — FAMOTIDINE 10 MG/ML
20 INJECTION, SOLUTION INTRAVENOUS AS NEEDED
Status: CANCELLED | OUTPATIENT
Start: 2019-01-10

## 2019-01-08 RX ORDER — HYDROCODONE BITARTRATE AND ACETAMINOPHEN 10; 325 MG/1; MG/1
1 TABLET ORAL EVERY 6 HOURS PRN
Qty: 120 TABLET | Refills: 0 | Status: SHIPPED | OUTPATIENT
Start: 2019-01-08 | End: 2019-02-08 | Stop reason: SDUPTHER

## 2019-01-08 NOTE — PROGRESS NOTES
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  Status post major surgeries, including bone graft from his right lower leg to his jaw, chemotherapy and radiation.  Continue to follow-up at [facility].   he has follow-up August 9.  He is due for follow-up CT scan which he will obtain in [place]  His pain is relatively well-controlled on Narco.  He is here today for refill of his normal call.  He is on puréed diet per oral, only uses his PEG tube for medication.  He denies fever chills, denies nausea or vomiting, denies headache.  He has maintained his weight.  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 He Misbah new symptom, n/v/cp/sob/dizziness/     Past Medical History:   Past Medical History:   Diagnosis Date   • Abdominal fistula    • Dysphonia    • Encounter for antineoplastic chemotherapy    • Encounter for long-term (current) use of medications    • Foot drop    • Iron deficiency anemia, unspecified    • Squamous cell cancer of tongue (CMS/HCC)      Past Surgical History:   Past Surgical History:   Procedure Laterality Date   • CAROTID STENT     • EXPLORATORY LAPAROTOMY N/A 5/11/2018    Procedure: EXPLORATORY LAPAROTOMY , TAKEDOWN GASTROCUTANEOUS  FISTULA;  Surgeon: Nancie Silveira MD;  Location: [facility];  Service: General   • MOUTH SURGERY     • PORTACATH PLACEMENT     • TONGUE BIOPSY / EXCISION       Social History:   Social History     Socioeconomic History   • Marital status: Single     Spouse name: Not on file   • Number of children: Not on file   • Years of education: Not on file   • Highest education level: Not on file   Social Needs   • Financial resource strain: Not on file   • Food insecurity - worry: Not on file   • Food insecurity - inability: Not  on file   • Transportation needs - medical: Not on file   • Transportation needs - non-medical: Not on file   Occupational History   • Not on file   Tobacco Use   • Smoking status: Former Smoker     Types: Cigarettes   • Smokeless tobacco: Never Used   • Tobacco comment: quit nov 2013   Substance and Sexual Activity   • Alcohol use: No   • Drug use: No   • Sexual activity: Defer   Other Topics Concern   • Not on file   Social History Narrative   • Not on file     Family History:   Family History   Problem Relation Age of Onset   • No Known Problems Mother    • Heart failure Father    • No Known Problems Daughter    • No Known Problems Son    • No Known Problems Daughter        Review of Systems   Constitutional: Negative.    HENT: Negative for congestion, dental problem, drooling, ear discharge, ear pain, facial swelling, hearing loss, mouth sores, sinus pressure, sore throat, tinnitus, trouble swallowing and voice change.    Eyes: Negative for photophobia, pain, discharge, redness, itching and visual disturbance.   Respiratory: Negative for cough, choking, chest tightness, shortness of breath, wheezing and stridor.    Cardiovascular: Negative.    Gastrointestinal: Negative for abdominal distention, abdominal pain, constipation, diarrhea, nausea and vomiting.   Endocrine: Negative for polyuria.   Genitourinary: Negative for flank pain and hematuria.   Musculoskeletal: Negative.    Skin: Negative for color change.   Neurological: Negative for dizziness, tremors, seizures, syncope, facial asymmetry, speech difficulty, weakness, light-headedness, numbness and headaches.   Hematological: Negative for adenopathy. Does not bruise/bleed easily.   Psychiatric/Behavioral: Negative for behavioral problems and confusion.        Medications:  The current medication list was reviewed in the EMR    ALLERGIES:    Allergies   Allergen Reactions   • Codeine Swelling     Swelling of throat       Objective      Vitals:    01/08/19  "0950   BP: 138/74   Pulse: 85   Resp: 18   Temp: 98.4 °F (36.9 °C)   TempSrc: Tympanic   SpO2: 91%   Weight: 63.5 kg (140 lb)   Height: 177.8 cm (70\")   PainSc:   4   PainLoc: Head  Comment: c/o constant pain left ear, jaw, neck       Current Status 1/8/2019   ECOG score 2         General Appearance:   Cachectic-appearing, Alert, cooperative, no distress, appears stated age   Head:    Normocephalic, without obvious abnormality, atraumatic   Eyes:    PERRL, conjunctiva/corneas clear, EOM's intact, fundi     benign, both eyes   Ears:    Normal TM's and external ear canals, both ears   Nose:   Nares normal, septum midline, mucosa normal, no drainage     or sinus tenderness   Throat:   Lips, mucosa, and tongue normal; teeth and gums normal   Neck:   Surgical scar well-healed, radiation changes, scab on the left upper jaw. no adenopathy;     thyroid:  no enlargement/tenderness/nodules; no carotid    bruit or JVD   Back:     Symmetric, no curvature, ROM normal, no CVA tenderness   Lungs:     Clear to auscultation bilaterally, respirations unlabored   Chest Wall:    No tenderness or deformity    Heart:    Regular rate and rhythm, S1 and S2 normal, no murmur, rub    or gallop   Abdomen:     Soft, non-tender, bowel sounds active all four quadrants,     no masses, no organomegaly.PEG tube intact    Extremities:   Extremities normal, atraumatic, no cyanosis or edema   Pulses:   2+ and symmetric all extremities   Skin:   Skin color, texture, turgor normal, no rashes or lesions   Lymph nodes:   Cervical, supraclavicular, and axillary nodes normal   Neurologic:   CNII-XII intact, normal strength, sensation and reflexes     throughout       RECENT LABS:  Lab Results (last 24 hours)     ** No results found for the last 24 hours. **          RADIOLOGY:  No results found.      VTE prophylaxis reviewed and addressed    Assessment/Plan  57-year-old male diagnosed with moderately differentiated invasive squamous cell carcinoma of the " oral cavity stage IV a status post major surgeries including jaw transplant from a bone in his right leg in 2013, status post chemotherapy and radiation, currently CLARK.  Continue follow-up at the [facility] in [place]. CT due at that visit .  We will  see him in 3 months unless things change.  Jaw pain: Continue Narco refilled today.    2 -Anemia: low %saturation, iron 49, folate 4.8, b12 426. He is on ferrous sulfate without adequate rise. s/p injectafer  --Hg 11.9 today. Check ferritin.additional injectafer. For target Hg of 14, then some for marrow storage    3. CAD: on plavix    4. Pain from cancer: on norco refilled           Obiageli Dominick Kelsey MD    1/14/2019    11:12 AM    Physical Exam

## 2019-01-10 ENCOUNTER — APPOINTMENT (OUTPATIENT)
Dept: ONCOLOGY | Facility: HOSPITAL | Age: 59
End: 2019-01-10
Attending: INTERNAL MEDICINE

## 2019-02-06 DIAGNOSIS — C76.0 HEAD AND NECK CANCER (HCC): Primary | ICD-10-CM

## 2019-02-08 ENCOUNTER — OFFICE VISIT (OUTPATIENT)
Dept: ONCOLOGY | Facility: CLINIC | Age: 59
End: 2019-02-08

## 2019-02-08 ENCOUNTER — LAB (OUTPATIENT)
Dept: LAB | Facility: HOSPITAL | Age: 59
End: 2019-02-08
Attending: INTERNAL MEDICINE

## 2019-02-08 ENCOUNTER — APPOINTMENT (OUTPATIENT)
Dept: LAB | Facility: HOSPITAL | Age: 59
End: 2019-02-08
Attending: INTERNAL MEDICINE

## 2019-02-08 VITALS
HEART RATE: 110 BPM | HEIGHT: 70 IN | DIASTOLIC BLOOD PRESSURE: 80 MMHG | TEMPERATURE: 97.8 F | OXYGEN SATURATION: 90 % | WEIGHT: 123.8 LBS | RESPIRATION RATE: 17 BRPM | SYSTOLIC BLOOD PRESSURE: 138 MMHG | BODY MASS INDEX: 17.72 KG/M2

## 2019-02-08 DIAGNOSIS — D50.8 OTHER IRON DEFICIENCY ANEMIA: Primary | ICD-10-CM

## 2019-02-08 DIAGNOSIS — C76.0 HEAD AND NECK CANCER (HCC): ICD-10-CM

## 2019-02-08 DIAGNOSIS — C76.0 MALIGNANT NEOPLASM OF HEAD, FACE, AND NECK (HCC): Primary | ICD-10-CM

## 2019-02-08 DIAGNOSIS — C02.9 TONGUE CANCER (HCC): ICD-10-CM

## 2019-02-08 LAB
ALBUMIN SERPL-MCNC: 4.2 G/DL (ref 3.5–5)
ALBUMIN/GLOB SERPL: 1 G/DL (ref 1.1–2.5)
ALP SERPL-CCNC: 118 U/L (ref 24–120)
ALT SERPL W P-5'-P-CCNC: <15 U/L (ref 0–54)
ANION GAP SERPL CALCULATED.3IONS-SCNC: 7 MMOL/L (ref 4–13)
AST SERPL-CCNC: 64 U/L (ref 7–45)
BASOPHILS # BLD AUTO: 0.07 10*3/MM3 (ref 0–0.2)
BASOPHILS NFR BLD AUTO: 0.7 % (ref 0–2)
BILIRUB SERPL-MCNC: 0.4 MG/DL (ref 0.1–1)
BUN BLD-MCNC: 16 MG/DL (ref 5–21)
BUN/CREAT SERPL: 21.9 (ref 7–25)
CALCIUM SPEC-SCNC: 9.5 MG/DL (ref 8.4–10.4)
CHLORIDE SERPL-SCNC: 94 MMOL/L (ref 98–110)
CO2 SERPL-SCNC: 32 MMOL/L (ref 24–31)
CREAT BLD-MCNC: 0.73 MG/DL (ref 0.5–1.4)
DEPRECATED RDW RBC AUTO: 57.5 FL (ref 40–54)
EOSINOPHIL # BLD AUTO: 0.5 10*3/MM3 (ref 0–0.7)
EOSINOPHIL NFR BLD AUTO: 4.7 % (ref 0–4)
ERYTHROCYTE [DISTWIDTH] IN BLOOD BY AUTOMATED COUNT: 18.3 % (ref 12–15)
GFR SERPL CREATININE-BSD FRML MDRD: 109 ML/MIN/1.73
GLOBULIN UR ELPH-MCNC: 4.2 GM/DL
GLUCOSE BLD-MCNC: 103 MG/DL (ref 70–100)
HCT VFR BLD AUTO: 40.9 % (ref 40–52)
HGB BLD-MCNC: 13.2 G/DL (ref 14–18)
HOLD SPECIMEN: NORMAL
HOLD SPECIMEN: NORMAL
IMM GRANULOCYTES # BLD AUTO: 0.03 10*3/MM3 (ref 0–0.03)
IMM GRANULOCYTES NFR BLD AUTO: 0.3 % (ref 0–5)
LYMPHOCYTES # BLD AUTO: 1.05 10*3/MM3 (ref 0.72–4.86)
LYMPHOCYTES NFR BLD AUTO: 9.8 % (ref 15–45)
MCH RBC QN AUTO: 27.7 PG (ref 28–32)
MCHC RBC AUTO-ENTMCNC: 32.3 G/DL (ref 33–36)
MCV RBC AUTO: 85.9 FL (ref 82–95)
MONOCYTES # BLD AUTO: 0.81 10*3/MM3 (ref 0.19–1.3)
MONOCYTES NFR BLD AUTO: 7.5 % (ref 4–12)
NEUTROPHILS # BLD AUTO: 8.28 10*3/MM3 (ref 1.87–8.4)
NEUTROPHILS NFR BLD AUTO: 77 % (ref 39–78)
NRBC BLD AUTO-RTO: 0 /100 WBC (ref 0–0)
PLATELET # BLD AUTO: 447 10*3/MM3 (ref 130–400)
PMV BLD AUTO: 9.1 FL (ref 6–12)
POTASSIUM BLD-SCNC: 4.7 MMOL/L (ref 3.5–5.3)
PROT SERPL-MCNC: 8.4 G/DL (ref 6.3–8.7)
RBC # BLD AUTO: 4.76 10*6/MM3 (ref 4.8–5.9)
SODIUM BLD-SCNC: 133 MMOL/L (ref 135–145)
WBC NRBC COR # BLD: 10.74 10*3/MM3 (ref 4.8–10.8)

## 2019-02-08 PROCEDURE — 85025 COMPLETE CBC W/AUTO DIFF WBC: CPT

## 2019-02-08 PROCEDURE — 80053 COMPREHEN METABOLIC PANEL: CPT

## 2019-02-08 PROCEDURE — 99214 OFFICE O/P EST MOD 30 MIN: CPT | Performed by: INTERNAL MEDICINE

## 2019-02-08 PROCEDURE — 36415 COLL VENOUS BLD VENIPUNCTURE: CPT

## 2019-02-08 RX ORDER — HYDROCODONE BITARTRATE AND ACETAMINOPHEN 10; 325 MG/1; MG/1
1 TABLET ORAL EVERY 6 HOURS PRN
Qty: 120 TABLET | Refills: 0 | Status: SHIPPED | OUTPATIENT
Start: 2019-02-08 | End: 2019-01-01 | Stop reason: SDUPTHER

## 2019-02-08 NOTE — PROGRESS NOTES
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  Status post major surgeries, including bone graft from his right lower leg to his jaw, chemotherapy and radiation.  Continue to follow-up at [facility].   he has follow-up August 9.  He is due for follow-up CT scan which he will obtain in [place]  His pain is relatively well-controlled on Narco.  He is here today for refill of his normal call.  He is on puréed diet per oral, only uses his PEG tube for medication.  He denies fever chills, denies nausea or vomiting, denies headache.  He has maintained his weight.  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   2/8/19: pt was down with the flu for 3 weeks. He did not seek medical help. He lost weight because of that. Currently feels better. He did not get his iron infusions that were scheduled. He is ready to complete those. He Misbah new symptom, n/v/cp/sob/dizziness/ he needs refill of his pain meds.    Past Medical History:   Past Medical History:   Diagnosis Date   • Abdominal fistula    • Dysphonia    • Encounter for antineoplastic chemotherapy    • Encounter for long-term (current) use of medications    • Foot drop    • Iron deficiency anemia, unspecified    • Squamous cell cancer of tongue (CMS/HCC)      Past Surgical History:   Past Surgical History:   Procedure Laterality Date   • CAROTID STENT     • EXPLORATORY LAPAROTOMY N/A 5/11/2018    Procedure: EXPLORATORY LAPAROTOMY , TAKEDOWN GASTROCUTANEOUS  FISTULA;  Surgeon: Nancie Silveira MD;  Location: [facility];  Service: General   • MOUTH SURGERY     • PORTACATH PLACEMENT     • TONGUE BIOPSY / EXCISION       Social History:   Social History     Socioeconomic History   • Marital status: Single     Spouse name: Not on  file   • Number of children: Not on file   • Years of education: Not on file   • Highest education level: Not on file   Social Needs   • Financial resource strain: Not on file   • Food insecurity - worry: Not on file   • Food insecurity - inability: Not on file   • Transportation needs - medical: Not on file   • Transportation needs - non-medical: Not on file   Occupational History   • Not on file   Tobacco Use   • Smoking status: Former Smoker     Types: Cigarettes   • Smokeless tobacco: Never Used   • Tobacco comment: quit nov 2013   Substance and Sexual Activity   • Alcohol use: No   • Drug use: No   • Sexual activity: Defer   Other Topics Concern   • Not on file   Social History Narrative   • Not on file     Family History:   Family History   Problem Relation Age of Onset   • No Known Problems Mother    • Heart failure Father    • No Known Problems Daughter    • No Known Problems Son    • No Known Problems Daughter        Review of Systems   Constitutional: Negative.    HENT: Negative for congestion, dental problem, drooling, ear discharge, ear pain, facial swelling, hearing loss, mouth sores, sinus pressure, sore throat, tinnitus, trouble swallowing and voice change.    Eyes: Negative for photophobia, pain, discharge, redness, itching and visual disturbance.   Respiratory: Negative for cough, choking, chest tightness, shortness of breath, wheezing and stridor.    Cardiovascular: Negative.    Gastrointestinal: Negative for abdominal distention, abdominal pain, constipation, diarrhea, nausea and vomiting.   Endocrine: Negative for polyuria.   Genitourinary: Negative for flank pain and hematuria.   Musculoskeletal: Negative.    Skin: Negative for color change.   Neurological: Negative for dizziness, tremors, seizures, syncope, facial asymmetry, speech difficulty, weakness, light-headedness, numbness and headaches.   Hematological: Negative for adenopathy. Does not bruise/bleed easily.   Psychiatric/Behavioral:  Negative for behavioral problems and confusion.        Medications:  The current medication list was reviewed in the EMR    ALLERGIES:    Allergies   Allergen Reactions   • Codeine Swelling     Swelling of throat       Objective      There were no vitals filed for this visit.    Current Status 1/8/2019   ECOG score 2     No change    General Appearance:   Cachectic-appearing, Alert, cooperative, no distress, appears stated age   Head:    Normocephalic, without obvious abnormality, atraumatic   Eyes:    PERRL, conjunctiva/corneas clear, EOM's intact, fundi     benign, both eyes   Ears:    Normal TM's and external ear canals, both ears   Nose:   Nares normal, septum midline, mucosa normal, no drainage     or sinus tenderness   Throat:   Lips, mucosa, and tongue normal; teeth and gums normal   Neck:   Surgical scar well-healed, radiation changes, scab on the left upper jaw. no adenopathy;     thyroid:  no enlargement/tenderness/nodules; no carotid    bruit or JVD   Back:     Symmetric, no curvature, ROM normal, no CVA tenderness   Lungs:     Clear to auscultation bilaterally, respirations unlabored   Chest Wall:    No tenderness or deformity    Heart:    Regular rate and rhythm, S1 and S2 normal, no murmur, rub    or gallop   Abdomen:     Soft, non-tender, bowel sounds active all four quadrants,     no masses, no organomegaly.PEG tube intact    Extremities:   Extremities normal, atraumatic, no cyanosis or edema   Pulses:   2+ and symmetric all extremities   Skin:   Skin color, texture, turgor normal, no rashes or lesions   Lymph nodes:   Cervical, supraclavicular, and axillary nodes normal   Neurologic:   CNII-XII intact, normal strength, sensation and reflexes     throughout       RECENT LABS:  Lab Results (last 24 hours)     ** No results found for the last 24 hours. **          RADIOLOGY:  No results found.      VTE prophylaxis reviewed and addressed    Assessment/Plan  57-year-old male diagnosed with moderately  differentiated invasive squamous cell carcinoma of the oral cavity stage IV a status post major surgeries including jaw transplant from a bone in his right leg in 2013, status post chemotherapy and radiation, currently CLARK.    --Continue follow-up at the [facility] in [place]. CT due at that visit in [facility].    We will  see him in 3 months unless things change.  Jaw pain: Continue Narco refilled today.    2 -Anemia: low %saturation, iron 49, folate 4.8, b12 426. He is on ferrous sulfate without adequate rise. s/p injectafer  --Hg 11.9 today.  Ferritin 570. additional injectafer. For target Hg of 14, then some for marrow storage    3. CAD: on plavix    4. Pain from cancer: on norco refilled           Obiageli Dominick Kelsey MD    2/8/2019    10:01 AM    Physical Exam

## 2019-02-14 ENCOUNTER — INFUSION (OUTPATIENT)
Dept: ONCOLOGY | Facility: HOSPITAL | Age: 59
End: 2019-02-14
Attending: INTERNAL MEDICINE

## 2019-02-14 VITALS
BODY MASS INDEX: 17.61 KG/M2 | RESPIRATION RATE: 18 BRPM | HEART RATE: 128 BPM | WEIGHT: 123 LBS | SYSTOLIC BLOOD PRESSURE: 156 MMHG | TEMPERATURE: 98 F | HEIGHT: 70 IN | DIASTOLIC BLOOD PRESSURE: 78 MMHG

## 2019-02-14 DIAGNOSIS — Z95.828 PORT-A-CATH IN PLACE: ICD-10-CM

## 2019-02-14 DIAGNOSIS — D50.9 IRON DEFICIENCY ANEMIA, UNSPECIFIED IRON DEFICIENCY ANEMIA TYPE: ICD-10-CM

## 2019-02-14 DIAGNOSIS — K90.9 INTESTINAL MALABSORPTION, UNSPECIFIED TYPE: Primary | ICD-10-CM

## 2019-02-14 DIAGNOSIS — D50.8 IRON DEFICIENCY ANEMIA SECONDARY TO INADEQUATE DIETARY IRON INTAKE: ICD-10-CM

## 2019-02-14 PROCEDURE — 25010000002 FERRIC CARBOXYMALTOSE 750 MG/15ML SOLUTION 15 ML VIAL: Performed by: INTERNAL MEDICINE

## 2019-02-14 PROCEDURE — 96374 THER/PROPH/DIAG INJ IV PUSH: CPT

## 2019-02-14 RX ORDER — MEPERIDINE HYDROCHLORIDE 50 MG/ML
25 INJECTION INTRAMUSCULAR; INTRAVENOUS; SUBCUTANEOUS
Status: DISCONTINUED | OUTPATIENT
Start: 2019-02-14 | End: 2019-02-14 | Stop reason: HOSPADM

## 2019-02-14 RX ORDER — FAMOTIDINE 10 MG/ML
20 INJECTION, SOLUTION INTRAVENOUS AS NEEDED
Status: DISCONTINUED | OUTPATIENT
Start: 2019-02-14 | End: 2019-02-14 | Stop reason: HOSPADM

## 2019-02-14 RX ORDER — DIPHENHYDRAMINE HYDROCHLORIDE 50 MG/ML
50 INJECTION INTRAMUSCULAR; INTRAVENOUS AS NEEDED
Status: DISCONTINUED | OUTPATIENT
Start: 2019-02-14 | End: 2019-02-14 | Stop reason: HOSPADM

## 2019-02-14 RX ORDER — SODIUM CHLORIDE 0.9 % (FLUSH) 0.9 %
10 SYRINGE (ML) INJECTION AS NEEDED
Status: CANCELLED | OUTPATIENT
Start: 2019-02-14

## 2019-02-14 RX ORDER — SODIUM CHLORIDE 9 MG/ML
250 INJECTION, SOLUTION INTRAVENOUS ONCE
Status: COMPLETED | OUTPATIENT
Start: 2019-02-14 | End: 2019-02-14

## 2019-02-14 RX ORDER — SODIUM CHLORIDE 0.9 % (FLUSH) 0.9 %
10 SYRINGE (ML) INJECTION AS NEEDED
Status: DISCONTINUED | OUTPATIENT
Start: 2019-02-14 | End: 2019-02-14 | Stop reason: HOSPADM

## 2019-02-14 RX ADMIN — SODIUM CHLORIDE, PRESERVATIVE FREE 10 ML: 5 INJECTION INTRAVENOUS at 14:14

## 2019-02-14 RX ADMIN — FERRIC CARBOXYMALTOSE INJECTION 750 MG: 50 INJECTION, SOLUTION INTRAVENOUS at 13:38

## 2019-02-14 RX ADMIN — Medication 500 UNITS: at 14:15

## 2019-02-14 RX ADMIN — SODIUM CHLORIDE 250 ML: 9 INJECTION, SOLUTION INTRAVENOUS at 13:38

## 2019-03-08 ENCOUNTER — HOSPITAL ENCOUNTER (OUTPATIENT)
Dept: GENERAL RADIOLOGY | Age: 59
Discharge: HOME | End: 2019-03-08
Payer: MEDICARE

## 2019-03-08 ENCOUNTER — OFFICE VISIT (OUTPATIENT)
Dept: FAMILY MEDICINE CLINIC | Age: 59
End: 2019-03-08
Payer: MEDICARE

## 2019-03-08 VITALS
DIASTOLIC BLOOD PRESSURE: 90 MMHG | SYSTOLIC BLOOD PRESSURE: 158 MMHG | HEART RATE: 91 BPM | RESPIRATION RATE: 20 BRPM | OXYGEN SATURATION: 91 % | TEMPERATURE: 98.3 F | WEIGHT: 120 LBS | BODY MASS INDEX: 17.22 KG/M2

## 2019-03-08 DIAGNOSIS — R63.4 WEIGHT LOSS: ICD-10-CM

## 2019-03-08 DIAGNOSIS — R05.3 PERSISTENT COUGH: ICD-10-CM

## 2019-03-08 DIAGNOSIS — R05.3 PERSISTENT COUGH: Primary | ICD-10-CM

## 2019-03-08 LAB
ALBUMIN SERPL-MCNC: 4.2 G/DL (ref 3.5–5.2)
ALP BLD-CCNC: 114 U/L (ref 40–130)
ALT SERPL-CCNC: 10 U/L (ref 5–41)
ANION GAP SERPL CALCULATED.3IONS-SCNC: 16 MMOL/L (ref 7–19)
AST SERPL-CCNC: 18 U/L (ref 5–40)
BASOPHILS ABSOLUTE: 0.1 K/UL (ref 0–0.2)
BASOPHILS RELATIVE PERCENT: 0.9 % (ref 0–1)
BILIRUB SERPL-MCNC: 0.4 MG/DL (ref 0.2–1.2)
BUN BLDV-MCNC: 16 MG/DL (ref 8–23)
CALCIUM SERPL-MCNC: 9.8 MG/DL (ref 8.8–10.2)
CHLORIDE BLD-SCNC: 93 MMOL/L (ref 98–111)
CO2: 27 MMOL/L (ref 22–29)
CREAT SERPL-MCNC: 0.6 MG/DL (ref 0.5–1.2)
EOSINOPHILS ABSOLUTE: 0.3 K/UL (ref 0–0.6)
EOSINOPHILS RELATIVE PERCENT: 3.3 % (ref 0–5)
GFR NON-AFRICAN AMERICAN: >60
GLUCOSE BLD-MCNC: 125 MG/DL (ref 74–109)
HCT VFR BLD CALC: 45.9 % (ref 42–52)
HEMOGLOBIN: 14.5 G/DL (ref 14–18)
LYMPHOCYTES ABSOLUTE: 0.9 K/UL (ref 1.1–4.5)
LYMPHOCYTES RELATIVE PERCENT: 11 % (ref 20–40)
MCH RBC QN AUTO: 29.5 PG (ref 27–31)
MCHC RBC AUTO-ENTMCNC: 31.6 G/DL (ref 33–37)
MCV RBC AUTO: 93.5 FL (ref 80–94)
MONOCYTES ABSOLUTE: 0.9 K/UL (ref 0–0.9)
MONOCYTES RELATIVE PERCENT: 10.7 % (ref 0–10)
NEUTROPHILS ABSOLUTE: 6.3 K/UL (ref 1.5–7.5)
NEUTROPHILS RELATIVE PERCENT: 73.9 % (ref 50–65)
PDW BLD-RTO: 18.5 % (ref 11.5–14.5)
PLATELET # BLD: 492 K/UL (ref 130–400)
PMV BLD AUTO: 9.2 FL (ref 9.4–12.4)
POTASSIUM SERPL-SCNC: 5 MMOL/L (ref 3.5–5)
RBC # BLD: 4.91 M/UL (ref 4.7–6.1)
SODIUM BLD-SCNC: 136 MMOL/L (ref 136–145)
TOTAL PROTEIN: 8.9 G/DL (ref 6.6–8.7)
TSH SERPL DL<=0.05 MIU/L-ACNC: 2.37 UIU/ML (ref 0.27–4.2)
WBC # BLD: 8.5 K/UL (ref 4.8–10.8)

## 2019-03-08 PROCEDURE — 3017F COLORECTAL CA SCREEN DOC REV: CPT | Performed by: NURSE PRACTITIONER

## 2019-03-08 PROCEDURE — 1036F TOBACCO NON-USER: CPT | Performed by: NURSE PRACTITIONER

## 2019-03-08 PROCEDURE — G8419 CALC BMI OUT NRM PARAM NOF/U: HCPCS | Performed by: NURSE PRACTITIONER

## 2019-03-08 PROCEDURE — G8598 ASA/ANTIPLAT THER USED: HCPCS | Performed by: NURSE PRACTITIONER

## 2019-03-08 PROCEDURE — 71046 X-RAY EXAM CHEST 2 VIEWS: CPT

## 2019-03-08 PROCEDURE — G8427 DOCREV CUR MEDS BY ELIG CLIN: HCPCS | Performed by: NURSE PRACTITIONER

## 2019-03-08 PROCEDURE — G8484 FLU IMMUNIZE NO ADMIN: HCPCS | Performed by: NURSE PRACTITIONER

## 2019-03-08 PROCEDURE — 99214 OFFICE O/P EST MOD 30 MIN: CPT | Performed by: NURSE PRACTITIONER

## 2019-03-08 RX ORDER — AMOXICILLIN AND CLAVULANATE POTASSIUM 400; 57 MG/5ML; MG/5ML
POWDER, FOR SUSPENSION ORAL
Qty: 1 BOTTLE | Refills: 0 | Status: SHIPPED | OUTPATIENT
Start: 2019-03-08 | End: 2019-06-12

## 2019-03-08 RX ORDER — DEXTROMETHORPHAN HYDROBROMIDE AND PROMETHAZINE HYDROCHLORIDE 15; 6.25 MG/5ML; MG/5ML
5 SYRUP ORAL 4 TIMES DAILY PRN
Qty: 160 ML | Refills: 0 | Status: SHIPPED | OUTPATIENT
Start: 2019-03-08 | End: 2019-03-14 | Stop reason: SDUPTHER

## 2019-03-08 ASSESSMENT — ENCOUNTER SYMPTOMS
DIARRHEA: 0
SHORTNESS OF BREATH: 1
COUGH: 1
CONSTIPATION: 0

## 2019-03-08 ASSESSMENT — PATIENT HEALTH QUESTIONNAIRE - PHQ9
SUM OF ALL RESPONSES TO PHQ QUESTIONS 1-9: 0
2. FEELING DOWN, DEPRESSED OR HOPELESS: 0
1. LITTLE INTEREST OR PLEASURE IN DOING THINGS: 0
SUM OF ALL RESPONSES TO PHQ QUESTIONS 1-9: 0
SUM OF ALL RESPONSES TO PHQ9 QUESTIONS 1 & 2: 0

## 2019-03-11 ENCOUNTER — TELEPHONE (OUTPATIENT)
Dept: FAMILY MEDICINE CLINIC | Age: 59
End: 2019-03-11

## 2019-03-14 DIAGNOSIS — R05.3 PERSISTENT COUGH: ICD-10-CM

## 2019-03-14 RX ORDER — DEXTROMETHORPHAN HYDROBROMIDE AND PROMETHAZINE HYDROCHLORIDE 15; 6.25 MG/5ML; MG/5ML
5 SYRUP ORAL 4 TIMES DAILY PRN
Qty: 160 ML | Refills: 0 | Status: SHIPPED | OUTPATIENT
Start: 2019-03-14 | End: 2019-03-21

## 2019-03-18 ENCOUNTER — OFFICE VISIT (OUTPATIENT)
Dept: FAMILY MEDICINE CLINIC | Age: 59
End: 2019-03-18
Payer: MEDICARE

## 2019-03-18 VITALS
DIASTOLIC BLOOD PRESSURE: 68 MMHG | HEART RATE: 91 BPM | BODY MASS INDEX: 18.13 KG/M2 | TEMPERATURE: 98.8 F | SYSTOLIC BLOOD PRESSURE: 128 MMHG | WEIGHT: 126.38 LBS | RESPIRATION RATE: 20 BRPM

## 2019-03-18 DIAGNOSIS — J40 BRONCHITIS: Primary | ICD-10-CM

## 2019-03-18 PROCEDURE — 1036F TOBACCO NON-USER: CPT | Performed by: NURSE PRACTITIONER

## 2019-03-18 PROCEDURE — 99213 OFFICE O/P EST LOW 20 MIN: CPT | Performed by: NURSE PRACTITIONER

## 2019-03-18 PROCEDURE — 3017F COLORECTAL CA SCREEN DOC REV: CPT | Performed by: NURSE PRACTITIONER

## 2019-03-18 PROCEDURE — G8598 ASA/ANTIPLAT THER USED: HCPCS | Performed by: NURSE PRACTITIONER

## 2019-03-18 PROCEDURE — G8419 CALC BMI OUT NRM PARAM NOF/U: HCPCS | Performed by: NURSE PRACTITIONER

## 2019-03-18 PROCEDURE — G8484 FLU IMMUNIZE NO ADMIN: HCPCS | Performed by: NURSE PRACTITIONER

## 2019-03-18 PROCEDURE — G8427 DOCREV CUR MEDS BY ELIG CLIN: HCPCS | Performed by: NURSE PRACTITIONER

## 2019-03-18 ASSESSMENT — ENCOUNTER SYMPTOMS
SHORTNESS OF BREATH: 0
COUGH: 1

## 2019-04-09 NOTE — PROGRESS NOTES
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  Status post major surgeries, including bone graft from his right lower leg to his jaw, chemotherapy and radiation.  Continue to follow-up at [facility].   he has follow-up August 9.  He is due for follow-up CT scan which he will obtain in [place]  His pain is relatively well-controlled on Narco.  He is here today for refill of his normal call.  He is on puréed diet per oral, only uses his PEG tube for medication.  He denies fever chills, denies nausea or vomiting, denies headache.  He has maintained his weight.  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   2/8/19: pt was down with the flu for 3 weeks. He did not seek medical help. He lost weight because of that. Currently feels better. He did not get his iron infusions that were scheduled. He is ready to complete those.  4/9/19: he is doing better. Drinking 3 nutritional supplements in addition to eating food. Fells better overall. He Misbah new symptom, n/v/cp/sob/dizziness/ he needs refill of his pain meds.  Rest of ros unremarkable.    Past Medical History:   Past Medical History:   Diagnosis Date   • Abdominal fistula    • Dysphonia    • Encounter for antineoplastic chemotherapy    • Encounter for long-term (current) use of medications    • Foot drop    • Iron deficiency anemia, unspecified    • Squamous cell cancer of tongue (CMS/HCC)      Past Surgical History:   Past Surgical History:   Procedure Laterality Date   • CAROTID STENT     • EXPLORATORY LAPAROTOMY N/A 5/11/2018    Procedure: EXPLORATORY LAPAROTOMY , TAKEDOWN GASTROCUTANEOUS  FISTULA;  Surgeon: Nancie Silveira MD;  Location: [facility];  Service: General   • MOUTH SURGERY     • PORTACATH PLACEMENT     •  TONGUE BIOPSY / EXCISION       Social History:   Social History     Socioeconomic History   • Marital status: Single     Spouse name: Not on file   • Number of children: Not on file   • Years of education: Not on file   • Highest education level: Not on file   Tobacco Use   • Smoking status: Former Smoker     Types: Cigarettes   • Smokeless tobacco: Never Used   • Tobacco comment: quit nov 2013   Substance and Sexual Activity   • Alcohol use: No   • Drug use: No   • Sexual activity: Defer     Family History:   Family History   Problem Relation Age of Onset   • No Known Problems Mother    • Heart failure Father    • No Known Problems Daughter    • No Known Problems Son    • No Known Problems Daughter        Review of Systems   Constitutional: Negative.    HENT: Negative for congestion, dental problem, drooling, ear discharge, ear pain, facial swelling, hearing loss, mouth sores, sinus pressure, sore throat, tinnitus, trouble swallowing and voice change.    Eyes: Negative for photophobia, pain, discharge, redness, itching and visual disturbance.   Respiratory: Negative for cough, choking, chest tightness, shortness of breath, wheezing and stridor.    Cardiovascular: Negative.    Gastrointestinal: Negative for abdominal distention, abdominal pain, constipation, diarrhea, nausea and vomiting.   Endocrine: Negative for polyuria.   Genitourinary: Negative for flank pain and hematuria.   Musculoskeletal: Negative.    Skin: Negative for color change.   Neurological: Negative for dizziness, tremors, seizures, syncope, facial asymmetry, speech difficulty, weakness, light-headedness, numbness and headaches.   Hematological: Negative for adenopathy. Does not bruise/bleed easily.   Psychiatric/Behavioral: Negative for behavioral problems and confusion.        Medications:  The current medication list was reviewed in the EMR    ALLERGIES:    Allergies   Allergen Reactions   • Codeine Swelling     Swelling of throat  "      Objective      Vitals:    04/09/19 1017   BP: 144/64   Pulse: 99   Resp: 18   Temp: 97.8 °F (36.6 °C)   TempSrc: Tympanic   SpO2: 92%   Weight: 59.4 kg (131 lb)   Height: 177.8 cm (70\")   PainSc:   4   PainLoc: Neck  Comment: shoulder/neck/left jaw areas       Current Status 4/9/2019   ECOG score 1     Remains the same.  General Appearance:   Cachectic-appearing, Alert, cooperative, no distress, appears stated age   Head:    Normocephalic, without obvious abnormality, atraumatic   Eyes:    PERRL, conjunctiva/corneas clear, EOM's intact, fundi     benign, both eyes   Ears:    Normal TM's and external ear canals, both ears   Nose:   Nares normal, septum midline, mucosa normal, no drainage     or sinus tenderness   Throat:   Lips, mucosa, and tongue normal; teeth and gums normal   Neck:   Surgical scar well-healed, radiation changes, scab on the left upper jaw. no adenopathy;     thyroid:  no enlargement/tenderness/nodules; no carotid    bruit or JVD   Back:     Symmetric, no curvature, ROM normal, no CVA tenderness   Lungs:     Clear to auscultation bilaterally, respirations unlabored   Chest Wall:    No tenderness or deformity    Heart:    Regular rate and rhythm, S1 and S2 normal, no murmur, rub    or gallop   Abdomen:     Soft, non-tender, bowel sounds active all four quadrants,     no masses, no organomegaly.PEG tube intact    Extremities:   Extremities normal, atraumatic, no cyanosis or edema   Pulses:   2+ and symmetric all extremities   Skin:   Skin color, texture, turgor normal, no rashes or lesions   Lymph nodes:   Cervical, supraclavicular, and axillary nodes normal   Neurologic:   CNII-XII intact, normal strength, sensation and reflexes     throughout       RECENT LABS:  Lab Results (last 24 hours)     Procedure Component Value Units Date/Time    CBC & Differential [[id number]] Collected:  04/09/19 1012    Specimen:  Blood Updated:  04/09/19 1026    Narrative:       The following orders were created " for panel order CBC & Differential.  Procedure                               Abnormality         Status                     ---------                               -----------         ------                     CBC Auto Differential[[id number]]        Abnormal            Final result                 Please view results for these tests on the individual orders.    Comprehensive Metabolic Panel [[id number]] Collected:  04/09/19 1012    Specimen:  Blood Updated:  04/09/19 1021    CBC Auto Differential [[id number]]  (Abnormal) Collected:  04/09/19 1012    Specimen:  Blood Updated:  04/09/19 1026     WBC 6.29 10*3/mm3      RBC 4.62 10*6/mm3      Hemoglobin 14.1 g/dL      Hematocrit 43.8 %      MCV 94.8 fL      MCH 30.5 pg      MCHC 32.2 g/dL      RDW 16.1 %      RDW-SD 55.9 fl      MPV 9.2 fL      Platelets 385 10*3/mm3      Neutrophil % 64.7 %      Lymphocyte % 18.0 %      Monocyte % 8.9 %      Eosinophil % 6.8 %      Basophil % 1.3 %      Immature Grans % 0.3 %      Neutrophils, Absolute 4.07 10*3/mm3      Lymphocytes, Absolute 1.13 10*3/mm3      Monocytes, Absolute 0.56 10*3/mm3      Eosinophils, Absolute 0.43 10*3/mm3      Basophils, Absolute 0.08 10*3/mm3      Immature Grans, Absolute 0.02 10*3/mm3      nRBC 0.0 /100 WBC           RADIOLOGY:  No results found.      VTE prophylaxis reviewed and addressed    Assessment/Plan  57-year-old male diagnosed with moderately differentiated invasive squamous cell carcinoma of the oral cavity stage IV. 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    --Continue follow-up at the [facility] in [place]. CT due at that visit in [facility].    We will  see him in 3 months unless things change.  Jaw pain: Continue Narco refilled today.    2 -Anemia: low %saturation, iron 49, folate 4.8, b12 426. He is on ferrous sulfate without adequate rise. s/p injectafer  --Hg  Better at 14.1.  Ok to stop oral iron    3. CAD: on plavix    4. Pain from cancer: on norco refilled           Obiageli Dominick Kelsey MD    4/9/2019    10:32 AM    Physical Exam

## 2019-06-11 ENCOUNTER — NURSE TRIAGE (OUTPATIENT)
Dept: OTHER | Facility: CLINIC | Age: 59
End: 2019-06-11

## 2019-06-11 NOTE — TELEPHONE ENCOUNTER
Reason for Disposition   Patient wants to be seen    Protocols used: BREATHING DIFFICULTY-ADULT-OH    Pt's son, Anthony Tracey, calls and reports Bjorn's SOB. Anthony Tracey states [place] Plascencia is SOB with exertion only and it has been this way x2 weeks. Anthony Tracey denies h/o lung disease, but does states he has had SOB in the past.  Pt also has had some cough and congestion. History of throat surgery r/t cancer in 2014. Caller reports symptoms as documented above. Caller informed of disposition. Soft transfer to pre-service center to schedule apt as recommended. Care advice as documented.

## 2019-06-12 ENCOUNTER — OFFICE VISIT (OUTPATIENT)
Dept: FAMILY MEDICINE CLINIC | Age: 59
End: 2019-06-12
Payer: MEDICARE

## 2019-06-12 ENCOUNTER — HOSPITAL ENCOUNTER (OUTPATIENT)
Dept: GENERAL RADIOLOGY | Age: 59
Discharge: HOME | End: 2019-06-12
Payer: MEDICARE

## 2019-06-12 VITALS
BODY MASS INDEX: 17.65 KG/M2 | SYSTOLIC BLOOD PRESSURE: 118 MMHG | DIASTOLIC BLOOD PRESSURE: 70 MMHG | WEIGHT: 123 LBS | OXYGEN SATURATION: 83 % | TEMPERATURE: 97.5 F | HEART RATE: 118 BPM | RESPIRATION RATE: 24 BRPM

## 2019-06-12 DIAGNOSIS — J40 BRONCHITIS: Primary | ICD-10-CM

## 2019-06-12 DIAGNOSIS — I65.29 STENOSIS OF CAROTID ARTERY, UNSPECIFIED LATERALITY: ICD-10-CM

## 2019-06-12 DIAGNOSIS — J40 BRONCHITIS: ICD-10-CM

## 2019-06-12 DIAGNOSIS — I72.0 CAROTID ANEURYSM, LEFT (HCC): ICD-10-CM

## 2019-06-12 LAB
BASOPHILS ABSOLUTE: 0.1 K/UL (ref 0–0.2)
BASOPHILS RELATIVE PERCENT: 0.4 % (ref 0–1)
EOSINOPHILS ABSOLUTE: 0 K/UL (ref 0–0.6)
EOSINOPHILS RELATIVE PERCENT: 0 % (ref 0–5)
HCT VFR BLD CALC: 46.8 % (ref 42–52)
HEMOGLOBIN: 14.5 G/DL (ref 14–18)
LYMPHOCYTES ABSOLUTE: 0.9 K/UL (ref 1.1–4.5)
LYMPHOCYTES RELATIVE PERCENT: 4.2 % (ref 20–40)
MCH RBC QN AUTO: 31.2 PG (ref 27–31)
MCHC RBC AUTO-ENTMCNC: 31 G/DL (ref 33–37)
MCV RBC AUTO: 100.6 FL (ref 80–94)
MONOCYTES ABSOLUTE: 1 K/UL (ref 0–0.9)
MONOCYTES RELATIVE PERCENT: 4.7 % (ref 0–10)
NEUTROPHILS ABSOLUTE: 19.4 K/UL (ref 1.5–7.5)
NEUTROPHILS RELATIVE PERCENT: 90.2 % (ref 50–65)
PDW BLD-RTO: 13.6 % (ref 11.5–14.5)
PLATELET # BLD: 424 K/UL (ref 130–400)
PMV BLD AUTO: 9.4 FL (ref 9.4–12.4)
RBC # BLD: 4.65 M/UL (ref 4.7–6.1)
WBC # BLD: 21.5 K/UL (ref 4.8–10.8)

## 2019-06-12 PROCEDURE — G8419 CALC BMI OUT NRM PARAM NOF/U: HCPCS | Performed by: NURSE PRACTITIONER

## 2019-06-12 PROCEDURE — 1036F TOBACCO NON-USER: CPT | Performed by: NURSE PRACTITIONER

## 2019-06-12 PROCEDURE — 3017F COLORECTAL CA SCREEN DOC REV: CPT | Performed by: NURSE PRACTITIONER

## 2019-06-12 PROCEDURE — 96372 THER/PROPH/DIAG INJ SC/IM: CPT | Performed by: NURSE PRACTITIONER

## 2019-06-12 PROCEDURE — 71046 X-RAY EXAM CHEST 2 VIEWS: CPT

## 2019-06-12 PROCEDURE — 94640 AIRWAY INHALATION TREATMENT: CPT | Performed by: NURSE PRACTITIONER

## 2019-06-12 PROCEDURE — G8427 DOCREV CUR MEDS BY ELIG CLIN: HCPCS | Performed by: NURSE PRACTITIONER

## 2019-06-12 PROCEDURE — G8598 ASA/ANTIPLAT THER USED: HCPCS | Performed by: NURSE PRACTITIONER

## 2019-06-12 PROCEDURE — 99214 OFFICE O/P EST MOD 30 MIN: CPT | Performed by: NURSE PRACTITIONER

## 2019-06-12 RX ORDER — NEBULIZER ACCESSORIES
1 KIT MISCELLANEOUS DAILY PRN
Qty: 1 KIT | Refills: 0 | Status: SHIPPED | OUTPATIENT
Start: 2019-06-12

## 2019-06-12 RX ORDER — ALBUTEROL SULFATE 2.5 MG/3ML
2.5 SOLUTION RESPIRATORY (INHALATION) EVERY 6 HOURS PRN
Qty: 120 EACH | Refills: 3 | Status: SHIPPED | OUTPATIENT
Start: 2019-06-12

## 2019-06-12 RX ORDER — ALBUTEROL SULFATE 2.5 MG/3ML
2.5 SOLUTION RESPIRATORY (INHALATION) ONCE
Status: COMPLETED | OUTPATIENT
Start: 2019-06-12 | End: 2019-06-12

## 2019-06-12 RX ORDER — ASPIRIN 81 MG/1
81 TABLET ORAL DAILY
COMMUNITY

## 2019-06-12 RX ORDER — GUAIFENESIN 600 MG/1
600 TABLET, EXTENDED RELEASE ORAL 2 TIMES DAILY
Qty: 30 TABLET | Refills: 0 | Status: SHIPPED | OUTPATIENT
Start: 2019-06-12 | End: 2019-06-17 | Stop reason: ALTCHOICE

## 2019-06-12 RX ORDER — CLOPIDOGREL BISULFATE 75 MG/1
75 TABLET ORAL DAILY
Qty: 30 TABLET | Refills: 11 | Status: SHIPPED | OUTPATIENT
Start: 2019-06-12 | End: 2019-06-17 | Stop reason: SDUPTHER

## 2019-06-12 RX ORDER — PREDNISONE 20 MG/1
20 TABLET ORAL DAILY
Qty: 7 TABLET | Refills: 0 | Status: SHIPPED | OUTPATIENT
Start: 2019-06-12 | End: 2019-06-19

## 2019-06-12 RX ORDER — CEFDINIR 300 MG/1
300 CAPSULE ORAL 2 TIMES DAILY
Qty: 20 CAPSULE | Refills: 0 | Status: SHIPPED | OUTPATIENT
Start: 2019-06-12 | End: 2019-06-22

## 2019-06-12 RX ADMIN — ALBUTEROL SULFATE 2.5 MG: 2.5 SOLUTION RESPIRATORY (INHALATION) at 10:21

## 2019-06-12 ASSESSMENT — ENCOUNTER SYMPTOMS
COUGH: 1
SHORTNESS OF BREATH: 0
VOMITING: 0
WHEEZING: 1
NAUSEA: 0

## 2019-06-12 NOTE — PROGRESS NOTES
SUBJECTIVE:    Patient ID: Samara Aranda is a57 y.o. male. Samara Aranda is here today for Shortness of Breath (x 2 weeks) and Headache (and eyes hurt)  . HPI:   HPI     Patient is here today reporting cough and shortness of breath for approximately 2 weeks. Patient states no measured fever but does feel feverish at times. Patient also reports having sweats occasionally. Patient does have past medical history of bronchitis which responded well to treatment. Patient has not been taking any medication for current symptoms. Patient also reports having an associated headache which he describes as around his eyes. Patient does have productive cough that is with thick sputum. Patient also has past medical history of cancer. Patient denies any decrease in appetite. Patient's pulse ox has measured hypoxic here today within the office however patient refuses in office oxygen or oxygen at home. Patient is of sound mind here within the office today and answers questions appropriately, and is fully aware of his need for oxygen and the risk associated with low oxygen levels. However, patient continues to refuse oxygen. Patient states that he will go to the emergency room if he worsens. Past Medical History:   Diagnosis Date    Cancer [facility])     throat and tongue dx 2013    Carotid aneurysm, left ([facility] [address]     Carotid stenosis     stent placed April 2015    History of Clostridium difficile     History of Helicobacter pylori infection      Prior to Visit Medications    Medication Sig Taking?  Authorizing Provider   aspirin EC 81 MG EC tablet Take 81 mg by mouth daily Yes Historical Provider, MD   cefdinir (OMNICEF) 300 MG capsule Take 1 capsule by mouth 2 times daily for 10 days Yes KENROY Gallagher   predniSONE (DELTASONE) 20 MG tablet Take 1 tablet by mouth daily for 7 days Yes KENROY Gallagher   Respiratory Therapy Supplies (NEBULIZER/TUBING/MOUTHPIECE) KIT 1 kit by Does not apply route daily as needed (for qid prn use  DX-bronchitis) Yes Joie Lenz APRN   guaiFENesin (MUCINEX) 600 MG extended release tablet Take 1 tablet by mouth 2 times daily for 15 days Yes Joielaz Lenz APRNAM   albuterol (PROVENTIL) (2.5 MG/3ML) 0.083% nebulizer solution Take 3 mLs by nebulization every 6 hours as needed for Wheezing Yes Joielaz Lesliel APRN   clopidogrel (PLAVIX) 75 MG tablet Take 1 tablet by mouth daily Yes Joie Greenview APRN   HYDROcodone-acetaminophen (NORCO)  MG per tablet Take 1 tablet by mouth every 4 hours.  Yes Historical Provider, MD     Allergies   Allergen Reactions    Codeine Nausea And Vomiting     Past Surgical History:   Procedure Laterality Date    CORONARY ANGIOPLASTY WITH STENT PLACEMENT      MANDIBLE SURGERY      Ugalde placed in jaw () and removed      Family History   Problem Relation Age of Onset    High Blood Pressure Father     High Blood Pressure Sister     Colon Cancer Paternal Grandfather      Social History     Socioeconomic History    Marital status: Single     Spouse name: Not on file    Number of children: Not on file    Years of education: Not on file    Highest education level: Not on file   Occupational History    Not on file   Social Needs    Financial resource strain: Not on file    Food insecurity:     Worry: Not on file     Inability: Not on file    Transportation needs:     Medical: Not on file     Non-medical: Not on file   Tobacco Use    Smoking status: Former Smoker     Packs/day: 2.00     Years: 30.00     Pack years: 60.00     Types: Cigarettes     Last attempt to quit: 2012     Years since quittin.5    Smokeless tobacco: Never Used   Substance and Sexual Activity    Alcohol use: No     Alcohol/week: 0.0 oz    Drug use: No    Sexual activity: Not Currently     Partners: Female   Lifestyle    Physical activity:     Days per week: Not on file     Minutes per session: Not on file    Stress: Not on file   Relationships    Social connections:     Talks on phone: Not on file     Gets together: Not on file     Attends Christian service: Not on file     Active member of club or organization: Not on file     Attends meetings of clubs or organizations: Not on file     Relationship status: Not on file    Intimate partner violence:     Fear of current or ex partner: Not on file     Emotionally abused: Not on file     Physically abused: Not on file     Forced sexual activity: Not on file   Other Topics Concern    Not on file   Social History Narrative    Not on file       Review of Systems   Constitutional: Negative for appetite change and unexpected weight change. Respiratory: Positive for cough and wheezing. Negative for shortness of breath. Cardiovascular: Negative for leg swelling. Gastrointestinal: Negative for nausea and vomiting. Psychiatric/Behavioral: Positive for sleep disturbance (r/t cough). OBJECTIVE:    Physical Exam   Constitutional: He is oriented to person, place, and time. He appears well-developed and well-nourished. No distress. HENT:   Head: Normocephalic and atraumatic. Right Ear: Tympanic membrane, external ear and ear canal normal.   Left Ear: Tympanic membrane, external ear and ear canal normal.   Nose: Nose normal. Right sinus exhibits no maxillary sinus tenderness and no frontal sinus tenderness. Left sinus exhibits no maxillary sinus tenderness and no frontal sinus tenderness. Mouth/Throat: Uvula is midline, oropharynx is clear and moist and mucous membranes are normal. No oropharyngeal exudate, posterior oropharyngeal edema or posterior oropharyngeal erythema. Eyes: Pupils are equal, round, and reactive to light. Conjunctivae and EOM are normal.   Neck: No tracheal deviation present. Cardiovascular: Normal rate, regular rhythm and normal heart sounds. Pulmonary/Chest: Effort normal. No respiratory distress. He has no wheezes.    Rhonchi noted until after neb and clear lungs   Musculoskeletal:

## 2019-06-17 ENCOUNTER — HOSPITAL ENCOUNTER (OUTPATIENT)
Dept: GENERAL RADIOLOGY | Age: 59
Discharge: HOME | End: 2019-06-17
Payer: MEDICARE

## 2019-06-17 ENCOUNTER — OFFICE VISIT (OUTPATIENT)
Dept: FAMILY MEDICINE CLINIC | Age: 59
End: 2019-06-17
Payer: MEDICARE

## 2019-06-17 VITALS
TEMPERATURE: 98.3 F | HEART RATE: 71 BPM | WEIGHT: 126.6 LBS | RESPIRATION RATE: 20 BRPM | DIASTOLIC BLOOD PRESSURE: 72 MMHG | BODY MASS INDEX: 18.17 KG/M2 | SYSTOLIC BLOOD PRESSURE: 128 MMHG | OXYGEN SATURATION: 94 %

## 2019-06-17 DIAGNOSIS — R91.1 NODULE OF UPPER LOBE OF RIGHT LUNG: ICD-10-CM

## 2019-06-17 DIAGNOSIS — R91.1 NODULE OF UPPER LOBE OF RIGHT LUNG: Primary | ICD-10-CM

## 2019-06-17 DIAGNOSIS — D72.829 LEUKOCYTOSIS, UNSPECIFIED TYPE: ICD-10-CM

## 2019-06-17 LAB
BASOPHILS ABSOLUTE: 0 K/UL (ref 0–0.2)
BASOPHILS RELATIVE PERCENT: 0.2 % (ref 0–1)
EOSINOPHILS ABSOLUTE: 0.1 K/UL (ref 0–0.6)
EOSINOPHILS RELATIVE PERCENT: 0.9 % (ref 0–5)
HCT VFR BLD CALC: 45.2 % (ref 42–52)
HEMOGLOBIN: 14 G/DL (ref 14–18)
LYMPHOCYTES ABSOLUTE: 1.1 K/UL (ref 1.1–4.5)
LYMPHOCYTES RELATIVE PERCENT: 12.6 % (ref 20–40)
MCH RBC QN AUTO: 31.5 PG (ref 27–31)
MCHC RBC AUTO-ENTMCNC: 31 G/DL (ref 33–37)
MCV RBC AUTO: 101.8 FL (ref 80–94)
MONOCYTES ABSOLUTE: 0.6 K/UL (ref 0–0.9)
MONOCYTES RELATIVE PERCENT: 6.5 % (ref 0–10)
NEUTROPHILS ABSOLUTE: 7 K/UL (ref 1.5–7.5)
NEUTROPHILS RELATIVE PERCENT: 79 % (ref 50–65)
PDW BLD-RTO: 14.3 % (ref 11.5–14.5)
PLATELET # BLD: 390 K/UL (ref 130–400)
PMV BLD AUTO: 9.2 FL (ref 9.4–12.4)
RBC # BLD: 4.44 M/UL (ref 4.7–6.1)
WBC # BLD: 8.9 K/UL (ref 4.8–10.8)

## 2019-06-17 PROCEDURE — G8427 DOCREV CUR MEDS BY ELIG CLIN: HCPCS | Performed by: NURSE PRACTITIONER

## 2019-06-17 PROCEDURE — 1036F TOBACCO NON-USER: CPT | Performed by: NURSE PRACTITIONER

## 2019-06-17 PROCEDURE — G8419 CALC BMI OUT NRM PARAM NOF/U: HCPCS | Performed by: NURSE PRACTITIONER

## 2019-06-17 PROCEDURE — 3017F COLORECTAL CA SCREEN DOC REV: CPT | Performed by: NURSE PRACTITIONER

## 2019-06-17 PROCEDURE — 71250 CT THORAX DX C-: CPT

## 2019-06-17 PROCEDURE — 99213 OFFICE O/P EST LOW 20 MIN: CPT | Performed by: NURSE PRACTITIONER

## 2019-06-17 PROCEDURE — G8598 ASA/ANTIPLAT THER USED: HCPCS | Performed by: NURSE PRACTITIONER

## 2019-06-17 ASSESSMENT — ENCOUNTER SYMPTOMS
SHORTNESS OF BREATH: 0
COUGH: 1

## 2019-06-17 NOTE — PROGRESS NOTES
SUBJECTIVE:    Patient ID: Doug Carvalho is a57 y.o. male. Doug Carvalho is here today for Cough (follow up and abnormal cxr)  . HPI:   HPI     Pt states that he is here today for f/u on cough and breathing status. Pt states he is doing much better since having omnicef. Wbc elevated at 21.5. Pt states he worked out in yard yesterday. No fevers reported. Pt had refused oxygen at last ov. He denies any ongoing soa. Pt does have h/o throat and tongue CA. Pt had cxr and is as follows:  Narrative   History:   62year-old bronchitis. Reference:   Chest radiograph March 8, 2019   Findings:   Frontal and lateral chest radiographs performed. There is bronchitis favoring the upper lobes. This is likely chronic   although an acute component cannot be excluded. No acute consolidation   to There is an new 8 mm nodule in the inferior/anterior aspect of the   right upper lobe. No pleural effusion or pneumothorax. Left-sided   Port-A-Cath, tip SVC.       Impression   1. New nodule in the right upper lobe compared to March 8, 2019. CT   chest is recommended. Metastasis not excluded in this patient with   previous history of cancer. 2. Chronic bronchitis favoring the upper lobes. An acute exacerbation             Past Medical History:   Diagnosis Date    Cancer [facility])     throat and tongue dx 2013    Carotid aneurysm, left ([address]     Carotid stenosis     stent placed April 2015    History of Clostridium difficile     History of Helicobacter pylori infection      Prior to Visit Medications    Medication Sig Taking?  Authorizing Provider   clopidogrel (PLAVIX) 75 MG tablet TAKE ONE TABLET DAILY Yes KENROY Gallagher   aspirin EC 81 MG EC tablet Take 81 mg by mouth daily Yes Historical Provider, MD   cefdinir (OMNICEF) 300 MG capsule Take 1 capsule by mouth 2 times daily for 10 days Yes KENROY Gallagher   predniSONE (DELTASONE) 20 MG tablet Take 1 tablet by mouth daily for 7 days Yes KENROY Katz Respiratory Therapy Supplies (NEBULIZER/TUBING/MOUTHPIECE) KIT 1 kit by Does not apply route daily as needed (for qid prn use  DX-bronchitis) Yes KENROY Gallagher   albuterol (PROVENTIL) (2.5 MG/3ML) 0.083% nebulizer solution Take 3 mLs by nebulization every 6 hours as needed for Wheezing Yes Joie Atlanta, APRN   HYDROcodone-acetaminophen (NORCO)  MG per tablet Take 1 tablet by mouth every 4 hours.  Yes Historical Provider, MD     Allergies   Allergen Reactions    Codeine Nausea And Vomiting     Past Surgical History:   Procedure Laterality Date    CORONARY ANGIOPLASTY WITH STENT PLACEMENT      MANDIBLE SURGERY      Ugalde placed in jaw () and removed      Family History   Problem Relation Age of Onset    High Blood Pressure Father     High Blood Pressure Sister     Colon Cancer Paternal Grandfather      Social History     Socioeconomic History    Marital status: Single     Spouse name: Not on file    Number of children: Not on file    Years of education: Not on file    Highest education level: Not on file   Occupational History    Not on file   Social Needs    Financial resource strain: Not on file    Food insecurity:     Worry: Not on file     Inability: Not on file    Transportation needs:     Medical: Not on file     Non-medical: Not on file   Tobacco Use    Smoking status: Former Smoker     Packs/day: 2.00     Years: 30.00     Pack years: 60.00     Types: Cigarettes     Last attempt to quit: 2012     Years since quittin.5    Smokeless tobacco: Never Used   Substance and Sexual Activity    Alcohol use: No     Alcohol/week: 0.0 oz    Drug use: No    Sexual activity: Not Currently     Partners: Female   Lifestyle    Physical activity:     Days per week: Not on file     Minutes per session: Not on file    Stress: Not on file   Relationships    Social connections:     Talks on phone: Not on file     Gets together: Not on file     Attends Restoration service: Not on file Active member of club or organization: Not on file     Attends meetings of clubs or organizations: Not on file     Relationship status: Not on file    Intimate partner violence:     Fear of current or ex partner: Not on file     Emotionally abused: Not on file     Physically abused: Not on file     Forced sexual activity: Not on file   Other Topics Concern    Not on file   Social History Narrative    Not on file       Review of Systems   Constitutional: Negative for fever. HENT: Negative for congestion. Respiratory: Positive for cough. Negative for shortness of breath. OBJECTIVE:    Physical Exam   Constitutional: He is oriented to person, place, and time. He appears well-developed and well-nourished. Thin frame   HENT:   Head: Normocephalic and atraumatic. Eyes: Pupils are equal, round, and reactive to light. Conjunctivae and EOM are normal.   Neck: No tracheal deviation present. Surgical changes noted   Cardiovascular: Normal rate, regular rhythm and normal heart sounds. Pulmonary/Chest: Effort normal. No stridor. No respiratory distress. He has wheezes (intermittent expiratory wheeze). Neurological: He is alert and oriented to person, place, and time. Skin: Skin is warm and dry. Psychiatric: He has a normal mood and affect. His speech is normal and behavior is normal. Judgment and thought content normal. His mood appears not anxious. His affect is not angry. Cognition and memory are normal. He does not exhibit a depressed mood. Nursing note and vitals reviewed. /72 (Site: Left Upper Arm, Position: Sitting, Cuff Size: Child)   Pulse 71   Temp 98.3 °F (36.8 °C) (Temporal)   Resp 20   Wt 126 lb 9.6 oz (57.4 kg)   SpO2 94%   BMI 18.17 kg/m²      ASSESSMENT:      ICD-10-CM    1.  Nodule of upper lobe of right lung R91.1 CT CHEST WO CONTRAST   2. Leukocytosis, unspecified type D72.829 CBC Auto Differential       PLAN:    Crissy Monroe: Cough (follow up and abnormal cxr)  CT today if possible  Continue and complete current med tx plan  Continue increasing intake for wt gain. Lab today  F/u asap if worsen in any way and otherwise will be called with CT report. Diagnosis and orders for this visit are above. Please note that this chart was generated using dragon dictationsoftware. Although every effort was made to ensure the accuracy of this automated transcription, some errors in transcription may have occurred.

## 2019-06-20 DIAGNOSIS — J18.0 BRONCHOPNEUMONIA: Primary | ICD-10-CM

## 2019-07-09 NOTE — PROGRESS NOTES
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  Status post major surgeries, including bone graft from his right lower leg to his jaw, chemotherapy and radiation.  Continue to follow-up at [facility].   he has follow-up August 9.  He is due for follow-up CT scan which he will obtain in [place]  His pain is relatively well-controlled on Narco.  He is here today for refill of his normal call.  He is on puréed diet per oral, only uses his PEG tube for medication.  He denies fever chills, denies nausea or vomiting, denies headache.  He has maintained his weight.  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   2/8/19: pt was down with the flu for 3 weeks. He did not seek medical help. He lost weight because of that. Currently feels better. He did not get his iron infusions that were scheduled. He is ready to complete those.  4/9/19: he is doing better. Drinking 3 nutritional supplements in addition to eating food. Fells better overall.     7/9/19: since the last time he was seen. He was diagnosed with bronchial pneumonia. s/p antibiotics, also on inhaler. The inhaler makes him shacky. He also has appointment with pulmonologist. He does not remember his next appointment with ENT. I have advised him to call their office to find out. He farhana new symptom sob,n/v/cp/sob/dizziness/ he needs refill of his pain meds.  Rest of ros unremarkable. PE remains the same.    Past Medical History:   Past Medical History:   Diagnosis Date   • Abdominal fistula    • Dysphonia    • Encounter for antineoplastic chemotherapy    • Encounter for long-term (current) use of medications    • Foot drop    • Iron deficiency anemia, unspecified    • Squamous cell cancer of tongue (CMS/HCC)      Past  Surgical History:   Past Surgical History:   Procedure Laterality Date   • CAROTID STENT     • EXPLORATORY LAPAROTOMY N/A 5/11/2018    Procedure: EXPLORATORY LAPAROTOMY , TAKEDOWN GASTROCUTANEOUS  FISTULA;  Surgeon: Nancie Silveira MD;  Location: [facility] OR;  Service: General   • MOUTH SURGERY     • PORTACATH PLACEMENT     • TONGUE BIOPSY / EXCISION       Social History:   Social History     Socioeconomic History   • Marital status: Single     Spouse name: Not on file   • Number of children: Not on file   • Years of education: Not on file   • Highest education level: Not on file   Tobacco Use   • Smoking status: Former Smoker     Types: Cigarettes   • Smokeless tobacco: Never Used   • Tobacco comment: quit nov 2013   Substance and Sexual Activity   • Alcohol use: No   • Drug use: No   • Sexual activity: Defer     Family History:   Family History   Problem Relation Age of Onset   • No Known Problems Mother    • Heart failure Father    • No Known Problems Daughter    • No Known Problems Son    • No Known Problems Daughter        Review of Systems   Constitutional: Negative.    HENT: Negative for congestion, dental problem, drooling, ear discharge, ear pain, facial swelling, hearing loss, mouth sores, sinus pressure, sore throat, tinnitus, trouble swallowing and voice change.    Eyes: Negative for photophobia, pain, discharge, redness, itching and visual disturbance.   Respiratory: Negative for cough, choking, chest tightness, shortness of breath, wheezing and stridor.    Cardiovascular: Negative.    Gastrointestinal: Negative for abdominal distention, abdominal pain, constipation, diarrhea, nausea and vomiting.   Endocrine: Negative for polyuria.   Genitourinary: Negative for flank pain and hematuria.   Musculoskeletal: Negative.    Skin: Negative for color change.   Neurological: Negative for dizziness, tremors, seizures, syncope, facial asymmetry, speech difficulty, weakness, light-headedness, numbness and headaches.    Hematological: Negative for adenopathy. Does not bruise/bleed easily.   Psychiatric/Behavioral: Negative for behavioral problems and confusion.        Medications:  The current medication list was reviewed in the EMR    ALLERGIES:    Allergies   Allergen Reactions   • Codeine Swelling     Swelling of throat       Objective      There were no vitals filed for this visit.    Current Status 4/9/2019   ECOG score 1     Remains the same.  General Appearance:   Cachectic-appearing, Alert, cooperative, no distress, appears stated age   Head:    Normocephalic, without obvious abnormality, atraumatic   Eyes:    PERRL, conjunctiva/corneas clear, EOM's intact, fundi     benign, both eyes   Ears:    Normal TM's and external ear canals, both ears   Nose:   Nares normal, septum midline, mucosa normal, no drainage     or sinus tenderness   Throat:   Lips, mucosa, and tongue normal; teeth and gums normal   Neck:   Surgical scar well-healed, radiation changes, scab on the left upper jaw. no adenopathy;     thyroid:  no enlargement/tenderness/nodules; no carotid    bruit or JVD   Back:     Symmetric, no curvature, ROM normal, no CVA tenderness   Lungs:     Clear to auscultation bilaterally, respirations unlabored   Chest Wall:    No tenderness or deformity    Heart:    Regular rate and rhythm, S1 and S2 normal, no murmur, rub    or gallop   Abdomen:     Soft, non-tender, bowel sounds active all four quadrants,     no masses, no organomegaly.PEG tube intact    Extremities:   Extremities normal, atraumatic, no cyanosis or edema   Pulses:   2+ and symmetric all extremities   Skin:   Skin color, texture, turgor normal, no rashes or lesions   Lymph nodes:   Cervical, supraclavicular, and axillary nodes normal   Neurologic:   CNII-XII intact, normal strength, sensation and reflexes     throughout       RECENT LABS:  Lab Results (last 24 hours)     ** No results found for the last 24 hours. **          RADIOLOGY:  No results found.         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    --Continue follow-up at the [facility] in [place]. CT due at that visit in August   We will  see him in 3 months unless things change.  Jaw pain: Continue Narco refilled today.    2 -Anemia: low %saturation, iron 49, folate 4.8, b12 426. He is on ferrous sulfate without adequate rise. s/p injectafer  --Hg  Better at 15.1. Ok to stop oral iron    3. CAD: on plavix    4. Pain from cancer: on norco refilled           Obiageli Dominick Kelsey MD    7/9/2019    10:52 AM    Physical Exam

## 2019-10-09 PROBLEM — C80.1 CANCER (HCC): Status: ACTIVE | Noted: 2019-01-01

## 2019-10-09 PROBLEM — I72.0 CAROTID ANEURYSM, LEFT (HCC): Status: ACTIVE | Noted: 2019-01-01

## 2019-10-09 PROBLEM — Z86.19 HISTORY OF HELICOBACTER PYLORI INFECTION: Status: ACTIVE | Noted: 2019-01-01

## 2019-10-09 PROBLEM — I65.29 CAROTID STENOSIS: Status: ACTIVE | Noted: 2019-01-01

## 2019-10-09 PROBLEM — IMO0001 HISTORY OF CLOSTRIDIUM DIFFICILE: Status: ACTIVE | Noted: 2019-01-01

## 2019-10-09 NOTE — PROGRESS NOTES
[facility]  HEMATOLOGY & ONCOLOGY    Cancer Staging Information:  Cancer Staging  No matching staging information was found for the patient.      Subjective     VISIT DIAGNOSIS: No diagnosis found.    REASON FOR VISIT:     Chief Complaint   Patient presents with   • Head and Neck Cancer     Here for followup. No new issues   • Med Refill     Hydrocodone        HEMATOLOGY / ONCOLOGY HISTORY:    No history exists.           INTERVAL HISTORY  Patient ID: David Bullock is a 59 y.o. year old male  Here for f/u and refill of pain meds. Pain 4/10 without meds and 1/10 with med. This allows him to function on a day to day basis. Without it it is painful to do house work, eat and difficult to sleep at night.  He farhana new symptom sob,n/v/cp/sob/dizziness/ he needs refill of his pain meds.  Rest of ros unremarkable. PE remains the same..    Past Medical History:   Past Medical History:   Diagnosis Date   • Abdominal fistula    • Dysphonia    • Encounter for antineoplastic chemotherapy    • Encounter for long-term (current) use of medications    • Foot drop    • Iron deficiency anemia, unspecified    • Squamous cell cancer of tongue (CMS/HCC)      Past Surgical History:   Past Surgical History:   Procedure Laterality Date   • CAROTID STENT     • EXPLORATORY LAPAROTOMY N/A 5/11/2018    Procedure: EXPLORATORY LAPAROTOMY , TAKEDOWN GASTROCUTANEOUS  FISTULA;  Surgeon: Nancie Silveira MD;  Location: [facility] OR;  Service: General   • MOUTH SURGERY     • PORTACATH PLACEMENT     • TONGUE BIOPSY / EXCISION       Social History:   Social History     Socioeconomic History   • Marital status: Single     Spouse name: Not on file   • Number of children: Not on file   • Years of education: Not on file   • Highest education level: Not on file   Tobacco Use   • Smoking status: Former Smoker     Types: Cigarettes   • Smokeless tobacco: Never Used   • Tobacco comment: quit nov 2013   Substance and Sexual Activity   • Alcohol  "use: No   • Drug use: No   • Sexual activity: Defer     Family History:   Family History   Problem Relation Age of Onset   • No Known Problems Mother    • Heart failure Father    • No Known Problems Daughter    • No Known Problems Son    • No Known Problems Daughter        Review of Systems   See above. Otherwise neg  Performance Status:  Asymptomatic    Medications:    Current Outpatient Medications   Medication Sig Dispense Refill   • albuterol (PROVENTIL) (2.5 MG/3ML) 0.083% nebulizer solution   3   • clopidogrel (PLAVIX) 75 MG tablet Take 75 mg by mouth Daily.     • HYDROcodone-acetaminophen (NORCO)  MG per tablet Take 1 tablet by mouth Every 6 (Six) Hours As Needed for Moderate Pain . 120 tablet 0     No current facility-administered medications for this visit.        ALLERGIES:    Allergies   Allergen Reactions   • Codeine Swelling and Nausea And Vomiting     Swelling of throat       Objective      Vitals:    10/09/19 1034   BP: (!) 198/102   Pulse: 96   Resp: 16   Temp: 98 °F (36.7 °C)   TempSrc: Temporal   SpO2: 93%   Weight: 59.5 kg (131 lb 1.6 oz)   Height: 177.8 cm (70\")   PainSc:   4   PainLoc: Neck         Current Status 10/9/2019   ECOG score 1         Physical Exam  General Appearance: left face deformity and assymmetry.   Patient is awake, alert, oriented and in no acute distress. Patient is welldeveloped, wellnourished, and appears stated age.  HEENT: Normocephalic. Sclerae clear, conjunctiva pink, extraocular movements intact, pupils, round, reactive to light and  accommodation. Mouth and throat are clear with moist oral mucosa.  NECK: Supple, no jugular venous distention, thyroid not enlarged.  LYMPH: No cervical, supraclavicular, axillary, or inguinal lymphadenopathy.  CHEST: Equal bilateral expansion, AP  diameter normal, resonant percussion note  LUNGS: Good air movement, no rales, rhonchi, rubs or wheezes with auscultation  CARDIO: Regular sinus rhythm, no murmurs, gallops or " rubs.  ABDOMEN: Nondistended, soft, No tenderness, no guarding, no rebound, No hepatosplenomegaly. No abdominal masses. Bowel sounds positive. No hernia  GENITALIA: Not examined.  BREASTS: Not examined.  MUSKEL: No joint swelling, decreased motion, or inflammation  EXTREMS: No edema, clubbing, cyanosis, No varicose veins.  NEURO: Grossly nonfocal, Gait is coordinated and smooth, Cognition is preserved.  SKIN: No rashes, no ecchymoses, no petechia.  PSYCH: Oriented to time, place and person. Memory is preserved. Mood and affect appear normal      RECENT LABS:  No visits with results within 7 Day(s) from this visit.   Latest known visit with results is:   Orders Only on 07/08/2019   Component Date Value Ref Range Status   • Glucose 07/09/2019 93  70 - 100 mg/dL Final   • BUN 07/09/2019 19  5 - 21 mg/dL Final   • Creatinine 07/09/2019 0.61  0.50 - 1.40 mg/dL Final   • Sodium 07/09/2019 134* 135 - 145 mmol/L Final   • Potassium 07/09/2019 4.5  3.5 - 5.3 mmol/L Final   • Chloride 07/09/2019 94* 98 - 110 mmol/L Final   • CO2 07/09/2019 31.0  24.0 - 31.0 mmol/L Final   • Calcium 07/09/2019 10.0  8.4 - 10.4 mg/dL Final   • Total Protein 07/09/2019 8.1  6.3 - 8.7 g/dL Final   • Albumin 07/09/2019 4.50  3.50 - 5.00 g/dL Final   • ALT (SGPT) 07/09/2019 <15  0 - 54 U/L Final   • AST (SGOT) 07/09/2019 37  7 - 45 U/L Final   • Alkaline Phosphatase 07/09/2019 102  24 - 120 U/L Final   • Total Bilirubin 07/09/2019 0.8  0.1 - 1.0 mg/dL Final   • eGFR Non African Amer 07/09/2019 134  >60 mL/min/1.73 Final   • Globulin 07/09/2019 3.6  gm/dL Final   • A/G Ratio 07/09/2019 1.3  1.1 - 2.5 g/dL Final   • BUN/Creatinine Ratio 07/09/2019 31.1* 7.0 - 25.0 Final   • Anion Gap 07/09/2019 9.0  4.0 - 13.0 mmol/L Final   • Iron 07/09/2019 84  42 - 180 mcg/dL Final   • TIBC 07/09/2019 358  225 - 420 mcg/dL Final   • Iron Saturation 07/09/2019 23  20 - 45 % Final   • Ferritin 07/09/2019 521.00* 17.90 - 464.00 ng/mL Final   • WBC 07/09/2019 8.42   4.80 - 10.80 10*3/mm3 Final   • RBC 07/09/2019 4.78* 4.80 - 5.90 10*6/mm3 Final   • Hemoglobin 07/09/2019 15.1  14.0 - 18.0 g/dL Final   • Hematocrit 07/09/2019 45.9  40.0 - 52.0 % Final   • MCV 07/09/2019 96.0* 82.0 - 95.0 fL Final   • MCH 07/09/2019 31.6  28.0 - 32.0 pg Final   • MCHC 07/09/2019 32.9* 33.0 - 36.0 g/dL Final   • RDW 07/09/2019 14.3  12.0 - 15.0 % Final   • RDW-SD 07/09/2019 50.4  40.0 - 54.0 fl Final   • MPV 07/09/2019 9.2  6.0 - 12.0 fL Final   • Platelets 07/09/2019 356  130 - 400 10*3/mm3 Final   • Neutrophil % 07/09/2019 77.0  39.0 - 78.0 % Final   • Lymphocyte % 07/09/2019 11.6* 15.0 - 45.0 % Final   • Monocyte % 07/09/2019 6.3  4.0 - 12.0 % Final   • Eosinophil % 07/09/2019 4.4* 0.0 - 4.0 % Final   • Basophil % 07/09/2019 0.5  0.0 - 2.0 % Final   • Immature Grans % 07/09/2019 0.2  0.0 - 5.0 % Final   • Neutrophils, Absolute 07/09/2019 6.48  1.87 - 8.40 10*3/mm3 Final   • Lymphocytes, Absolute 07/09/2019 0.98  0.72 - 4.86 10*3/mm3 Final   • Monocytes, Absolute 07/09/2019 0.53  0.19 - 1.30 10*3/mm3 Final   • Eosinophils, Absolute 07/09/2019 0.37  0.00 - 0.70 10*3/mm3 Final   • Basophils, Absolute 07/09/2019 0.04  0.00 - 0.20 10*3/mm3 Final   • Immature Grans, Absolute 07/09/2019 0.02  0.00 - 0.05 10*3/mm3 Final   • nRBC 07/09/2019 0.0  0.0 - 0.2 /100 WBC Final       RADIOLOGY:  No results found.         Assessment/Plan  David Bullock is a 59 y.o. 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    Patient Active Problem List   Diagnosis   • Head and neck cancer (CMS/HCC)   • Fistula   • Iron deficiency anemia   • Intestinal malabsorption   • Port-A-Cath in place   • Carotid aneurysm, left (CMS/HCC)   • Carotid stenosis   • History of Clostridium difficile   • History of Helicobacter  pylori infection   • Cancer (CMS/HCC)            --H &N ca: Continue follow-up at the [facility] in [place]. CT due at that visit in August   We will  see him in 3 months unless things change  -5 months out. He has been dismissed by the surgeon. Imaging not indicated per NCCN unless symptomatic.  Jaw pain: pain 4/10 without med and down to 1/10 with pain meds. This allows him to funtion on a day to day basis.  --Continue Narco refilled today.     2 -Anemia: low %saturation, iron 49, folate 4.8, b12 426. He is on ferrous sulfate without adequate rise. s/p injectafer  --Hg  Better at 15.1. Ok to stop oral iron     3. CAD: on plavix     4. Pain from cancer: on norco refilled  5. Elevated blood pr: rechecked thrice even with a pediatric cuff. Pt advised to f/u with PCP.  rtc in 3 months.      Ena Kelsey MD    10/9/2019    10:51 AM

## 2020-01-01 ENCOUNTER — APPOINTMENT (OUTPATIENT)
Dept: LAB | Facility: HOSPITAL | Age: 60
End: 2020-01-01

## 2020-01-01 ENCOUNTER — TELEPHONE (OUTPATIENT)
Dept: ONCOLOGY | Facility: CLINIC | Age: 60
End: 2020-01-01

## 2020-01-01 ENCOUNTER — OFFICE VISIT (OUTPATIENT)
Dept: ONCOLOGY | Facility: CLINIC | Age: 60
End: 2020-01-01

## 2020-01-01 VITALS
OXYGEN SATURATION: 94 % | BODY MASS INDEX: 18.54 KG/M2 | DIASTOLIC BLOOD PRESSURE: 68 MMHG | RESPIRATION RATE: 16 BRPM | HEIGHT: 70 IN | SYSTOLIC BLOOD PRESSURE: 120 MMHG | TEMPERATURE: 99.4 F | WEIGHT: 129.5 LBS | HEART RATE: 97 BPM

## 2020-01-01 DIAGNOSIS — G89.3 CANCER ASSOCIATED PAIN: ICD-10-CM

## 2020-01-01 DIAGNOSIS — C76.0 HEAD AND NECK CANCER (HCC): Primary | ICD-10-CM

## 2020-01-01 DIAGNOSIS — D50.9 IRON DEFICIENCY ANEMIA, UNSPECIFIED IRON DEFICIENCY ANEMIA TYPE: ICD-10-CM

## 2020-01-01 LAB
ALBUMIN SERPL-MCNC: 4.2 G/DL (ref 3.5–5.2)
ALBUMIN/GLOB SERPL: 1.1 G/DL
ALP SERPL-CCNC: 84 U/L (ref 39–117)
ALT SERPL W P-5'-P-CCNC: 9 U/L (ref 1–41)
ANION GAP SERPL CALCULATED.3IONS-SCNC: 9 MMOL/L (ref 5–15)
AST SERPL-CCNC: 14 U/L (ref 1–40)
BASOPHILS # BLD AUTO: 0.02 10*3/MM3 (ref 0–0.2)
BASOPHILS NFR BLD AUTO: 0.2 % (ref 0–1.5)
BILIRUB SERPL-MCNC: 0.6 MG/DL (ref 0.2–1.2)
BUN BLD-MCNC: 13 MG/DL (ref 8–23)
BUN/CREAT SERPL: 18.1 (ref 7–25)
CALCIUM SPEC-SCNC: 10 MG/DL (ref 8.6–10.5)
CHLORIDE SERPL-SCNC: 96 MMOL/L (ref 98–107)
CO2 SERPL-SCNC: 33 MMOL/L (ref 22–29)
CREAT BLD-MCNC: 0.72 MG/DL (ref 0.76–1.27)
DEPRECATED RDW RBC AUTO: 51.4 FL (ref 37–54)
EOSINOPHIL # BLD AUTO: 0.17 10*3/MM3 (ref 0–0.4)
EOSINOPHIL NFR BLD AUTO: 1.8 % (ref 0.3–6.2)
ERYTHROCYTE [DISTWIDTH] IN BLOOD BY AUTOMATED COUNT: 14.9 % (ref 12.3–15.4)
GFR SERPL CREATININE-BSD FRML MDRD: 111 ML/MIN/1.73
GLOBULIN UR ELPH-MCNC: 3.8 GM/DL
GLUCOSE BLD-MCNC: 130 MG/DL (ref 65–99)
HCT VFR BLD AUTO: 44.4 % (ref 37.5–51)
HGB BLD-MCNC: 14.3 G/DL (ref 13–17.7)
HOLD SPECIMEN: NORMAL
HOLD SPECIMEN: NORMAL
IMM GRANULOCYTES # BLD AUTO: 0.03 10*3/MM3 (ref 0–0.05)
IMM GRANULOCYTES NFR BLD AUTO: 0.3 % (ref 0–0.5)
LYMPHOCYTES # BLD AUTO: 0.64 10*3/MM3 (ref 0.7–3.1)
LYMPHOCYTES NFR BLD AUTO: 6.8 % (ref 19.6–45.3)
MCH RBC QN AUTO: 31 PG (ref 26.6–33)
MCHC RBC AUTO-ENTMCNC: 32.2 G/DL (ref 31.5–35.7)
MCV RBC AUTO: 96.3 FL (ref 79–97)
MONOCYTES # BLD AUTO: 0.39 10*3/MM3 (ref 0.1–0.9)
MONOCYTES NFR BLD AUTO: 4.2 % (ref 5–12)
NEUTROPHILS # BLD AUTO: 8.12 10*3/MM3 (ref 1.7–7)
NEUTROPHILS NFR BLD AUTO: 86.7 % (ref 42.7–76)
NRBC BLD AUTO-RTO: 0 /100 WBC (ref 0–0.2)
PLATELET # BLD AUTO: 278 10*3/MM3 (ref 140–450)
PMV BLD AUTO: 9.1 FL (ref 6–12)
POTASSIUM BLD-SCNC: 4.5 MMOL/L (ref 3.5–5.2)
PROT SERPL-MCNC: 8 G/DL (ref 6–8.5)
RBC # BLD AUTO: 4.61 10*6/MM3 (ref 4.14–5.8)
SODIUM BLD-SCNC: 138 MMOL/L (ref 136–145)
WBC NRBC COR # BLD: 9.37 10*3/MM3 (ref 3.4–10.8)

## 2020-01-01 PROCEDURE — 36415 COLL VENOUS BLD VENIPUNCTURE: CPT | Performed by: INTERNAL MEDICINE

## 2020-01-01 PROCEDURE — 99214 OFFICE O/P EST MOD 30 MIN: CPT | Performed by: INTERNAL MEDICINE

## 2020-01-01 PROCEDURE — 85025 COMPLETE CBC W/AUTO DIFF WBC: CPT | Performed by: INTERNAL MEDICINE

## 2020-01-01 PROCEDURE — 80053 COMPREHEN METABOLIC PANEL: CPT | Performed by: INTERNAL MEDICINE

## 2020-01-01 RX ORDER — HYDROCODONE BITARTRATE AND ACETAMINOPHEN 10; 325 MG/1; MG/1
1 TABLET ORAL EVERY 6 HOURS PRN
Qty: 120 TABLET | Refills: 0 | Status: SHIPPED | OUTPATIENT
Start: 2020-01-01

## 2020-01-01 RX ORDER — HYDROCODONE BITARTRATE AND ACETAMINOPHEN 10; 325 MG/1; MG/1
1 TABLET ORAL EVERY 6 HOURS PRN
Qty: 120 TABLET | Refills: 0 | Status: SHIPPED | OUTPATIENT
Start: 2020-01-01 | End: 2020-01-01 | Stop reason: SDUPTHER

## 2020-01-07 NOTE — TELEPHONE ENCOUNTER
Received call from patient requesting refill of his pain medication today.    Apt f/u: padmini 1/13/2020

## 2020-01-21 NOTE — PROGRESS NOTES
[facility]  HEMATOLOGY & ONCOLOGY    Cancer Staging Information:  Cancer Staging  No matching staging information was found for the patient.      Subjective     VISIT DIAGNOSIS: No diagnosis found.    REASON FOR VISIT:     Chief Complaint   Patient presents with   • HEAD AND NECK CANCER     HERE FOR FOLLOW UP, NO COMPLAINTS OTHER THEN A COLD AND CONGESTION         HEMATOLOGY / ONCOLOGY HISTORY:    No history exists.           INTERVAL HISTORY  Patient ID: David Bullock is a 60 y.o. year old male  Here for f/u and refill of pain meds. Pain 4/10 without meds and 1/10 with med. This allows him to function on a day to day basis. Without it it is painful to do house work, eat and difficult to sleep at night.  He farhana new symptom sob,n/v/cp/sob/dizziness/ he needs refill of his pain meds.  Rest of ros unremarkable. PE remains the same..    Past Medical History:   Past Medical History:   Diagnosis Date   • Abdominal fistula    • Dysphonia    • Encounter for antineoplastic chemotherapy    • Encounter for long-term (current) use of medications    • Foot drop    • Iron deficiency anemia, unspecified    • Squamous cell cancer of tongue (CMS/HCC)      Past Surgical History:   Past Surgical History:   Procedure Laterality Date   • CAROTID STENT     • EXPLORATORY LAPAROTOMY N/A 5/11/2018    Procedure: EXPLORATORY LAPAROTOMY , TAKEDOWN GASTROCUTANEOUS  FISTULA;  Surgeon: Nancie Silveira MD;  Location: [facility];  Service: General   • MOUTH SURGERY     • PORTACATH PLACEMENT     • TONGUE BIOPSY / EXCISION       Social History:   Social History     Socioeconomic History   • Marital status: Single     Spouse name: Not on file   • Number of children: Not on file   • Years of education: Not on file   • Highest education level: Not on file   Tobacco Use   • Smoking status: Former Smoker     Types: Cigarettes   • Smokeless tobacco: Never Used   • Tobacco comment: quit nov 2013   Substance and Sexual Activity   •  "Alcohol use: No   • Drug use: No   • Sexual activity: Defer     Family History:   Family History   Problem Relation Age of Onset   • No Known Problems Mother    • Heart failure Father    • No Known Problems Daughter    • No Known Problems Son    • No Known Problems Daughter        Review of Systems   See above. Otherwise neg  Performance Status:  Asymptomatic    Medications:    Current Outpatient Medications   Medication Sig Dispense Refill   • albuterol (PROVENTIL) (2.5 MG/3ML) 0.083% nebulizer solution   3   • clopidogrel (PLAVIX) 75 MG tablet Take 75 mg by mouth Daily.     • HYDROcodone-acetaminophen (NORCO)  MG per tablet Take 1 tablet by mouth Every 6 (Six) Hours As Needed for Moderate Pain . 120 tablet 0     No current facility-administered medications for this visit.        ALLERGIES:    Allergies   Allergen Reactions   • Codeine Swelling and Nausea And Vomiting     Swelling of throat       Objective      Vitals:    01/21/20 1116   BP: 120/68   Pulse: 97   Resp: 16   Temp: 99.4 °F (37.4 °C)   SpO2: 94%   Weight: 58.7 kg (129 lb 8 oz)   Height: 177.8 cm (70\")   PainSc: 0-No pain         Current Status 1/21/2020   ECOG score 1         Physical Exam  General Appearance: left face deformity and assymmetry.   Patient is awake, alert, oriented and in no acute distress. Patient is welldeveloped, wellnourished, and appears stated age.  HEENT: Normocephalic. Sclerae clear, conjunctiva pink, extraocular movements intact, pupils, round, reactive to light and  accommodation. Mouth and throat are clear with moist oral mucosa.  NECK: Supple, no jugular venous distention, thyroid not enlarged.  LYMPH: No cervical, supraclavicular, axillary, or inguinal lymphadenopathy.  CHEST: Equal bilateral expansion, AP  diameter normal, resonant percussion note  LUNGS: Good air movement, no rales, rhonchi, rubs or wheezes with auscultation  CARDIO: Regular sinus rhythm, no murmurs, gallops or rubs.  ABDOMEN: Nondistended, soft, No " tenderness, no guarding, no rebound, No hepatosplenomegaly. No abdominal masses. Bowel sounds positive. No hernia  GENITALIA: Not examined.  BREASTS: Not examined.  MUSKEL: No joint swelling, decreased motion, or inflammation  EXTREMS: No edema, clubbing, cyanosis, No varicose veins.  NEURO: Grossly nonfocal, Gait is coordinated and smooth, Cognition is preserved.  SKIN: No rashes, no ecchymoses, no petechia.  PSYCH: Oriented to time, place and person. Memory is preserved. Mood and affect appear normal      RECENT LABS:  No visits with results within 7 Day(s) from this visit.   Latest known visit with results is:   Orders Only on 01/10/2020   Component Date Value Ref Range Status   • Glucose 01/21/2020 130* 65 - 99 mg/dL Final   • BUN 01/21/2020 13  8 - 23 mg/dL Final   • Creatinine 01/21/2020 0.72* 0.76 - 1.27 mg/dL Final   • Sodium 01/21/2020 138  136 - 145 mmol/L Final   • Potassium 01/21/2020 4.5  3.5 - 5.2 mmol/L Final   • Chloride 01/21/2020 96* 98 - 107 mmol/L Final   • CO2 01/21/2020 33.0* 22.0 - 29.0 mmol/L Final   • Calcium 01/21/2020 10.0  8.6 - 10.5 mg/dL Final   • Total Protein 01/21/2020 8.0  6.0 - 8.5 g/dL Final   • Albumin 01/21/2020 4.20  3.50 - 5.20 g/dL Final   • ALT (SGPT) 01/21/2020 9  1 - 41 U/L Final   • AST (SGOT) 01/21/2020 14  1 - 40 U/L Final   • Alkaline Phosphatase 01/21/2020 84  39 - 117 U/L Final   • Total Bilirubin 01/21/2020 0.6  0.2 - 1.2 mg/dL Final   • eGFR Non African Amer 01/21/2020 111  >60 mL/min/1.73 Final   • Globulin 01/21/2020 3.8  gm/dL Final   • A/G Ratio 01/21/2020 1.1  g/dL Final   • BUN/Creatinine Ratio 01/21/2020 18.1  7.0 - 25.0 Final   • Anion Gap 01/21/2020 9.0  5.0 - 15.0 mmol/L Final   • WBC 01/21/2020 9.37  3.40 - 10.80 10*3/mm3 Final   • RBC 01/21/2020 4.61  4.14 - 5.80 10*6/mm3 Final   • Hemoglobin 01/21/2020 14.3  13.0 - 17.7 g/dL Final   • Hematocrit 01/21/2020 44.4  37.5 - 51.0 % Final   • MCV 01/21/2020 96.3  79.0 - 97.0 fL Final   • MCH 01/21/2020 31.0   26.6 - 33.0 pg Final   • MCHC 01/21/2020 32.2  31.5 - 35.7 g/dL Final   • RDW 01/21/2020 14.9  12.3 - 15.4 % Final   • RDW-SD 01/21/2020 51.4  37.0 - 54.0 fl Final   • MPV 01/21/2020 9.1  6.0 - 12.0 fL Final   • Platelets 01/21/2020 278  140 - 450 10*3/mm3 Final   • Neutrophil % 01/21/2020 86.7* 42.7 - 76.0 % Final   • Lymphocyte % 01/21/2020 6.8* 19.6 - 45.3 % Final   • Monocyte % 01/21/2020 4.2* 5.0 - 12.0 % Final   • Eosinophil % 01/21/2020 1.8  0.3 - 6.2 % Final   • Basophil % 01/21/2020 0.2  0.0 - 1.5 % Final   • Immature Grans % 01/21/2020 0.3  0.0 - 0.5 % Final   • Neutrophils, Absolute 01/21/2020 8.12* 1.70 - 7.00 10*3/mm3 Final   • Lymphocytes, Absolute 01/21/2020 0.64* 0.70 - 3.10 10*3/mm3 Final   • Monocytes, Absolute 01/21/2020 0.39  0.10 - 0.90 10*3/mm3 Final   • Eosinophils, Absolute 01/21/2020 0.17  0.00 - 0.40 10*3/mm3 Final   • Basophils, Absolute 01/21/2020 0.02  0.00 - 0.20 10*3/mm3 Final   • Immature Grans, Absolute 01/21/2020 0.03  0.00 - 0.05 10*3/mm3 Final   • nRBC 01/21/2020 0.0  0.0 - 0.2 /100 WBC Final       RADIOLOGY:  No results found.         Assessment/Plan  David Bullock is a 60 y.o. 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    Patient Active Problem List   Diagnosis   • Head and neck cancer (CMS/HCC)   • Fistula   • Iron deficiency anemia   • Intestinal malabsorption   • Port-A-Cath in place   • Carotid aneurysm, left (CMS/HCC)   • Carotid stenosis   • History of Clostridium difficile   • History of Helicobacter pylori infection   • Cancer (CMS/HCC)            --H &N ca: Continue follow-up at the [facility] in [place]. CT due at that visit in August   We will  see him in 3 months unless things change  -5 months out. He has been dismissed by the surgeon.  Imaging not indicated per NCCN unless symptomatic.  Jaw pain: pain 4/10 without med and down to 1/10 with pain meds. This allows him to funtion on a day to day basis.  --Continue Narco refilled today.     2 -Anemia: resolved.  low %saturation, iron 49, folate 4.8, b12 426. He was on ferrous sulfate without adequate rise. s/p injectafer  --Hg  Better at 14.3 . Ok to stop oral iron. The rest of the lab unremarkable, wbc 9.37, Plt 278, cr 0.72, lft nl.  rtc in 3 motnhs     3. CAD: on plavix     4. Pain from cancer: on norco refilled  rtc in 3 months.      Ena Kelsey MD    1/21/2020    11:28 AM

## 2020-02-05 NOTE — TELEPHONE ENCOUNTER
Called patient to reschedule lab and doctor appt for 4/6/20. There was no answer but a message was left telling patient to call back to reschedule.

## 2020-03-31 ENCOUNTER — TELEPHONE (OUTPATIENT)
Dept: ONCOLOGY | Facility: CLINIC | Age: 60
End: 2020-03-31

## 2020-04-13 ENCOUNTER — APPOINTMENT (OUTPATIENT)
Dept: LAB | Facility: HOSPITAL | Age: 60
End: 2020-04-13

## (undated) DEVICE — DRSNG SURESITE123 4X10IN

## (undated) DEVICE — SUT SILK 2/0 SH CR8 18IN CR8 C012D

## (undated) DEVICE — PAD GRND REM POLYHESIVE A/ DISP

## (undated) DEVICE — SUT SILK 3/0 SUTUPAK TIES 24IN SA74H

## (undated) DEVICE — ANTIBACTERIAL UNDYED BRAIDED (POLYGLACTIN 910), SYNTHETIC ABSORBABLE SUTURE: Brand: COATED VICRYL

## (undated) DEVICE — SUT PDS LP 1 TP1 96IN VIO PDP880GA

## (undated) DEVICE — CLTH CLENS READYCLEANSE PERI CARE PK/5

## (undated) DEVICE — PAD MAJOR: Brand: MEDLINE INDUSTRIES, INC.

## (undated) DEVICE — GLV SURG BIOGEL M LTX PF 7 1/2

## (undated) DEVICE — PK TURNOVER RM ADV

## (undated) DEVICE — SUT SILK 2/0 SUTUPAK TIES 24IN SA75H

## (undated) DEVICE — APPL CHLORAPREP W/TINT 26ML ORNG